# Patient Record
Sex: FEMALE | Race: WHITE | NOT HISPANIC OR LATINO | Employment: STUDENT | URBAN - NONMETROPOLITAN AREA
[De-identification: names, ages, dates, MRNs, and addresses within clinical notes are randomized per-mention and may not be internally consistent; named-entity substitution may affect disease eponyms.]

---

## 2019-06-11 ENCOUNTER — HOSPITAL ENCOUNTER (INPATIENT)
Facility: HOSPITAL | Age: 19
LOS: 1 days | Discharge: HOME | End: 2019-06-12
Attending: INTERNAL MEDICINE | Admitting: UROLOGY

## 2019-06-11 ENCOUNTER — APPOINTMENT (OUTPATIENT)
Dept: CARDIOLOGY | Facility: HOSPITAL | Age: 19
End: 2019-06-11

## 2019-06-11 ENCOUNTER — ANESTHESIA (OUTPATIENT)
Dept: PERIOP | Facility: HOSPITAL | Age: 19
End: 2019-06-11

## 2019-06-11 ENCOUNTER — ANESTHESIA EVENT (OUTPATIENT)
Dept: PERIOP | Facility: HOSPITAL | Age: 19
End: 2019-06-11

## 2019-06-11 ENCOUNTER — APPOINTMENT (OUTPATIENT)
Dept: GENERAL RADIOLOGY | Facility: HOSPITAL | Age: 19
End: 2019-06-11

## 2019-06-11 DIAGNOSIS — N10 ACUTE PYELONEPHRITIS: Primary | ICD-10-CM

## 2019-06-11 DIAGNOSIS — N13.2 URETERAL STONE WITH HYDRONEPHROSIS: ICD-10-CM

## 2019-06-11 PROBLEM — E87.6 HYPOKALEMIA: Status: ACTIVE | Noted: 2019-06-11

## 2019-06-11 PROBLEM — A41.9 SEPSIS (HCC): Status: ACTIVE | Noted: 2019-06-11

## 2019-06-11 PROBLEM — N12 PYELONEPHRITIS: Status: ACTIVE | Noted: 2019-06-11

## 2019-06-11 PROBLEM — I31.39 PERICARDIAL EFFUSION: Status: ACTIVE | Noted: 2019-06-11

## 2019-06-11 LAB
ALBUMIN SERPL-MCNC: 3 G/DL (ref 3.5–5)
ALBUMIN/GLOB SERPL: 1 G/DL (ref 1.1–2.5)
ALP SERPL-CCNC: 86 U/L (ref 50–130)
ALT SERPL W P-5'-P-CCNC: 24 U/L (ref 0–54)
ANION GAP SERPL CALCULATED.3IONS-SCNC: 7 MMOL/L (ref 4–13)
ANISOCYTOSIS BLD QL: ABNORMAL
APTT PPP: 31.5 SECONDS (ref 24.1–35)
AST SERPL-CCNC: 17 U/L (ref 7–45)
BACTERIA UR QL AUTO: ABNORMAL /HPF
BH CV ECHO MEAS - AO MAX PG (FULL): 4.3 MMHG
BH CV ECHO MEAS - AO MAX PG: 8.5 MMHG
BH CV ECHO MEAS - AO MEAN PG (FULL): 3.7 MMHG
BH CV ECHO MEAS - AO MEAN PG: 5.7 MMHG
BH CV ECHO MEAS - AO ROOT AREA (BSA CORRECTED): 1.3
BH CV ECHO MEAS - AO ROOT AREA: 4.9 CM^2
BH CV ECHO MEAS - AO ROOT DIAM: 2.5 CM
BH CV ECHO MEAS - AO V2 MAX: 146 CM/SEC
BH CV ECHO MEAS - AO V2 MEAN: 111 CM/SEC
BH CV ECHO MEAS - AO V2 VTI: 31.4 CM
BH CV ECHO MEAS - AVA(I,A): 2.3 CM^2
BH CV ECHO MEAS - AVA(I,D): 2.3 CM^2
BH CV ECHO MEAS - AVA(V,A): 2.4 CM^2
BH CV ECHO MEAS - AVA(V,D): 2.4 CM^2
BH CV ECHO MEAS - BSA(HAYCOCK): 2 M^2
BH CV ECHO MEAS - BSA: 1.9 M^2
BH CV ECHO MEAS - BZI_BMI: 31.8 KILOGRAMS/M^2
BH CV ECHO MEAS - BZI_METRIC_HEIGHT: 165.1 CM
BH CV ECHO MEAS - BZI_METRIC_WEIGHT: 86.6 KG
BH CV ECHO MEAS - EDV(CUBED): 70.4 ML
BH CV ECHO MEAS - EDV(MOD-SP4): 125 ML
BH CV ECHO MEAS - EDV(TEICH): 75.5 ML
BH CV ECHO MEAS - EF(CUBED): 68.2 %
BH CV ECHO MEAS - EF(MOD-SP4): 61 %
BH CV ECHO MEAS - EF(TEICH): 60.2 %
BH CV ECHO MEAS - ESV(CUBED): 22.4 ML
BH CV ECHO MEAS - ESV(MOD-SP4): 48.7 ML
BH CV ECHO MEAS - ESV(TEICH): 30.1 ML
BH CV ECHO MEAS - FS: 31.7 %
BH CV ECHO MEAS - IVS/LVPW: 1.1
BH CV ECHO MEAS - IVSD: 1.5 CM
BH CV ECHO MEAS - LA DIMENSION: 3.4 CM
BH CV ECHO MEAS - LA/AO: 1.4
BH CV ECHO MEAS - LAT PEAK E' VEL: 13.6 CM/SEC
BH CV ECHO MEAS - LV DIASTOLIC VOL/BSA (35-75): 64.4 ML/M^2
BH CV ECHO MEAS - LV MASS(C)D: 220.1 GRAMS
BH CV ECHO MEAS - LV MASS(C)DI: 113.5 GRAMS/M^2
BH CV ECHO MEAS - LV MAX PG: 4.2 MMHG
BH CV ECHO MEAS - LV MEAN PG: 2 MMHG
BH CV ECHO MEAS - LV SYSTOLIC VOL/BSA (12-30): 25.1 ML/M^2
BH CV ECHO MEAS - LV V1 MAX: 103 CM/SEC
BH CV ECHO MEAS - LV V1 MEAN: 68.2 CM/SEC
BH CV ECHO MEAS - LV V1 VTI: 21 CM
BH CV ECHO MEAS - LVIDD: 4.1 CM
BH CV ECHO MEAS - LVIDS: 2.8 CM
BH CV ECHO MEAS - LVLD AP4: 9.4 CM
BH CV ECHO MEAS - LVLS AP4: 8.3 CM
BH CV ECHO MEAS - LVOT AREA (M): 3.5 CM^2
BH CV ECHO MEAS - LVOT AREA: 3.5 CM^2
BH CV ECHO MEAS - LVOT DIAM: 2.1 CM
BH CV ECHO MEAS - LVPWD: 1.3 CM
BH CV ECHO MEAS - MED PEAK E' VEL: 12.3 CM/SEC
BH CV ECHO MEAS - MV A MAX VEL: 57.7 CM/SEC
BH CV ECHO MEAS - MV DEC TIME: 0.28 SEC
BH CV ECHO MEAS - MV E MAX VEL: 92.6 CM/SEC
BH CV ECHO MEAS - MV E/A: 1.6
BH CV ECHO MEAS - SI(AO): 79.5 ML/M^2
BH CV ECHO MEAS - SI(CUBED): 24.8 ML/M^2
BH CV ECHO MEAS - SI(LVOT): 37.5 ML/M^2
BH CV ECHO MEAS - SI(MOD-SP4): 39.3 ML/M^2
BH CV ECHO MEAS - SI(TEICH): 23.4 ML/M^2
BH CV ECHO MEAS - SV(AO): 154.3 ML
BH CV ECHO MEAS - SV(CUBED): 48 ML
BH CV ECHO MEAS - SV(LVOT): 72.7 ML
BH CV ECHO MEAS - SV(MOD-SP4): 76.3 ML
BH CV ECHO MEAS - SV(TEICH): 45.4 ML
BH CV ECHO MEASUREMENTS AVERAGE E/E' RATIO: 7.15
BILIRUB SERPL-MCNC: 0.4 MG/DL (ref 0.6–1.4)
BILIRUB UR QL STRIP: NEGATIVE
BUN BLD-MCNC: 15 MG/DL (ref 5–21)
BUN/CREAT SERPL: 16 (ref 7–25)
BURR CELLS BLD QL SMEAR: ABNORMAL
CALCIUM SPEC-SCNC: 8.7 MG/DL (ref 8.4–10.4)
CHLORIDE SERPL-SCNC: 106 MMOL/L (ref 98–110)
CLARITY UR: CLEAR
CO2 SERPL-SCNC: 25 MMOL/L (ref 24–31)
COLOR UR: YELLOW
CREAT BLD-MCNC: 0.94 MG/DL (ref 0.5–1.4)
CRP SERPL-MCNC: 34.94 MG/DL (ref 0–0.99)
D-LACTATE SERPL-SCNC: 2 MMOL/L (ref 0.5–2)
D-LACTATE SERPL-SCNC: 2.2 MMOL/L (ref 0.5–2)
DEPRECATED RDW RBC AUTO: 42.2 FL (ref 40–54)
EOSINOPHIL # BLD MANUAL: 0.5 10*3/MM3 (ref 0–0.7)
EOSINOPHIL NFR BLD MANUAL: 2 % (ref 0–4)
ERYTHROCYTE [DISTWIDTH] IN BLOOD BY AUTOMATED COUNT: 13.4 % (ref 12–15)
ERYTHROCYTE [SEDIMENTATION RATE] IN BLOOD: 34 MM/HR (ref 0–20)
GFR SERPL CREATININE-BSD FRML MDRD: 77 ML/MIN/1.73
GLOBULIN UR ELPH-MCNC: 3.1 GM/DL
GLUCOSE BLD-MCNC: 133 MG/DL (ref 70–100)
GLUCOSE UR STRIP-MCNC: NEGATIVE MG/DL
HCT VFR BLD AUTO: 35.7 % (ref 37–47)
HGB BLD-MCNC: 12.4 G/DL (ref 12–16)
HGB UR QL STRIP.AUTO: ABNORMAL
HOLD SPECIMEN: NORMAL
KETONES UR QL STRIP: NEGATIVE
LEFT ATRIUM VOLUME INDEX: 23.9 ML/M2
LEFT ATRIUM VOLUME: 46.3 CM3
LEUKOCYTE ESTERASE UR QL STRIP.AUTO: ABNORMAL
LYMPHOCYTES # BLD MANUAL: 1.74 10*3/MM3 (ref 0.72–4.86)
LYMPHOCYTES NFR BLD MANUAL: 3 % (ref 4–12)
LYMPHOCYTES NFR BLD MANUAL: 7 % (ref 15–45)
MAGNESIUM SERPL-MCNC: 1.9 MG/DL (ref 1.4–2.2)
MAXIMAL PREDICTED HEART RATE: 201 BPM
MCH RBC QN AUTO: 30 PG (ref 28–32)
MCHC RBC AUTO-ENTMCNC: 34.7 G/DL (ref 33–36)
MCV RBC AUTO: 86.2 FL (ref 82–98)
MONOCYTES # BLD AUTO: 0.74 10*3/MM3 (ref 0.19–1.3)
MYELOCYTES NFR BLD MANUAL: 1 % (ref 0–0)
NEUTROPHILS # BLD AUTO: 21.58 10*3/MM3 (ref 1.87–8.4)
NEUTROPHILS NFR BLD MANUAL: 77 % (ref 39–78)
NEUTS BAND NFR BLD MANUAL: 10 % (ref 0–10)
NITRITE UR QL STRIP: NEGATIVE
PH UR STRIP.AUTO: 5.5 [PH] (ref 5–8)
PHOSPHATE SERPL-MCNC: 2.6 MG/DL (ref 2.5–4.5)
PLAT MORPH BLD: NORMAL
PLATELET # BLD AUTO: 282 10*3/MM3 (ref 130–400)
PMV BLD AUTO: 10 FL (ref 6–12)
POIKILOCYTOSIS BLD QL SMEAR: ABNORMAL
POTASSIUM BLD-SCNC: 3 MMOL/L (ref 3.5–5.3)
PROCALCITONIN SERPL-MCNC: 2.15 NG/ML
PROT SERPL-MCNC: 6.1 G/DL (ref 6.3–8.7)
PROT UR QL STRIP: ABNORMAL
RBC # BLD AUTO: 4.14 10*6/MM3 (ref 4.2–5.4)
RBC # UR: ABNORMAL /HPF
REF LAB TEST METHOD: ABNORMAL
SODIUM BLD-SCNC: 138 MMOL/L (ref 135–145)
SP GR UR STRIP: 1.01 (ref 1–1.03)
SQUAMOUS #/AREA URNS HPF: ABNORMAL /HPF
STRESS TARGET HR: 171 BPM
TROPONIN I SERPL-MCNC: <0.012 NG/ML (ref 0–0.03)
TROPONIN I SERPL-MCNC: <0.012 NG/ML (ref 0–0.03)
TSH SERPL DL<=0.05 MIU/L-ACNC: 2.21 MIU/ML (ref 0.47–4.68)
UROBILINOGEN UR QL STRIP: ABNORMAL
WBC MORPH BLD: NORMAL
WBC NRBC COR # BLD: 24.81 10*3/MM3 (ref 4.8–10.8)
WBC UR QL AUTO: ABNORMAL /HPF

## 2019-06-11 PROCEDURE — 85651 RBC SED RATE NONAUTOMATED: CPT | Performed by: INTERNAL MEDICINE

## 2019-06-11 PROCEDURE — 25010000002 ONDANSETRON PER 1 MG: Performed by: NURSE ANESTHETIST, CERTIFIED REGISTERED

## 2019-06-11 PROCEDURE — 0T778DZ DILATION OF LEFT URETER WITH INTRALUMINAL DEVICE, VIA NATURAL OR ARTIFICIAL OPENING ENDOSCOPIC: ICD-10-PCS | Performed by: UROLOGY

## 2019-06-11 PROCEDURE — 84443 ASSAY THYROID STIM HORMONE: CPT | Performed by: INTERNAL MEDICINE

## 2019-06-11 PROCEDURE — 25010000002 DEXAMETHASONE PER 1 MG: Performed by: NURSE ANESTHETIST, CERTIFIED REGISTERED

## 2019-06-11 PROCEDURE — 74420 UROGRAPHY RTRGR +-KUB: CPT

## 2019-06-11 PROCEDURE — G0378 HOSPITAL OBSERVATION PER HR: HCPCS

## 2019-06-11 PROCEDURE — 85007 BL SMEAR W/DIFF WBC COUNT: CPT | Performed by: INTERNAL MEDICINE

## 2019-06-11 PROCEDURE — 87040 BLOOD CULTURE FOR BACTERIA: CPT | Performed by: INTERNAL MEDICINE

## 2019-06-11 PROCEDURE — 94799 UNLISTED PULMONARY SVC/PX: CPT

## 2019-06-11 PROCEDURE — 25010000002 CEFTRIAXONE PER 250 MG: Performed by: INTERNAL MEDICINE

## 2019-06-11 PROCEDURE — 93010 ELECTROCARDIOGRAM REPORT: CPT | Performed by: INTERNAL MEDICINE

## 2019-06-11 PROCEDURE — 84484 ASSAY OF TROPONIN QUANT: CPT | Performed by: INTERNAL MEDICINE

## 2019-06-11 PROCEDURE — 87086 URINE CULTURE/COLONY COUNT: CPT | Performed by: INTERNAL MEDICINE

## 2019-06-11 PROCEDURE — 74420 UROGRAPHY RTRGR +-KUB: CPT | Performed by: UROLOGY

## 2019-06-11 PROCEDURE — 94760 N-INVAS EAR/PLS OXIMETRY 1: CPT

## 2019-06-11 PROCEDURE — 85730 THROMBOPLASTIN TIME PARTIAL: CPT | Performed by: INTERNAL MEDICINE

## 2019-06-11 PROCEDURE — 93005 ELECTROCARDIOGRAM TRACING: CPT | Performed by: INTERNAL MEDICINE

## 2019-06-11 PROCEDURE — 99244 OFF/OP CNSLTJ NEW/EST MOD 40: CPT | Performed by: UROLOGY

## 2019-06-11 PROCEDURE — 84145 PROCALCITONIN (PCT): CPT | Performed by: INTERNAL MEDICINE

## 2019-06-11 PROCEDURE — C1758 CATHETER, URETERAL: HCPCS | Performed by: UROLOGY

## 2019-06-11 PROCEDURE — C1769 GUIDE WIRE: HCPCS | Performed by: UROLOGY

## 2019-06-11 PROCEDURE — 25010000002 IOPAMIDOL 61 % SOLUTION: Performed by: UROLOGY

## 2019-06-11 PROCEDURE — 25010000002 CEFTRIAXONE PER 250 MG: Performed by: NURSE ANESTHETIST, CERTIFIED REGISTERED

## 2019-06-11 PROCEDURE — 93306 TTE W/DOPPLER COMPLETE: CPT | Performed by: INTERNAL MEDICINE

## 2019-06-11 PROCEDURE — 25010000002 MIDAZOLAM PER 1 MG: Performed by: ANESTHESIOLOGY

## 2019-06-11 PROCEDURE — 25010000002 PERFLUTREN 6.52 MG/ML SUSPENSION: Performed by: INTERNAL MEDICINE

## 2019-06-11 PROCEDURE — 52351 CYSTOURETERO & OR PYELOSCOPE: CPT | Performed by: UROLOGY

## 2019-06-11 PROCEDURE — 84100 ASSAY OF PHOSPHORUS: CPT | Performed by: INTERNAL MEDICINE

## 2019-06-11 PROCEDURE — C2617 STENT, NON-COR, TEM W/O DEL: HCPCS | Performed by: UROLOGY

## 2019-06-11 PROCEDURE — 80053 COMPREHEN METABOLIC PANEL: CPT | Performed by: INTERNAL MEDICINE

## 2019-06-11 PROCEDURE — 86140 C-REACTIVE PROTEIN: CPT | Performed by: INTERNAL MEDICINE

## 2019-06-11 PROCEDURE — 83735 ASSAY OF MAGNESIUM: CPT | Performed by: INTERNAL MEDICINE

## 2019-06-11 PROCEDURE — 25810000003 SODIUM CHLORIDE 0.9 % WITH KCL 20 MEQ 20-0.9 MEQ/L-% SOLUTION: Performed by: INTERNAL MEDICINE

## 2019-06-11 PROCEDURE — 83605 ASSAY OF LACTIC ACID: CPT | Performed by: INTERNAL MEDICINE

## 2019-06-11 PROCEDURE — 52332 CYSTOSCOPY AND TREATMENT: CPT | Performed by: UROLOGY

## 2019-06-11 PROCEDURE — 25010000002 HYDROMORPHONE PER 4 MG: Performed by: INTERNAL MEDICINE

## 2019-06-11 PROCEDURE — 25010000002 FENTANYL CITRATE (PF) 250 MCG/5ML SOLUTION: Performed by: NURSE ANESTHETIST, CERTIFIED REGISTERED

## 2019-06-11 PROCEDURE — 81001 URINALYSIS AUTO W/SCOPE: CPT | Performed by: INTERNAL MEDICINE

## 2019-06-11 PROCEDURE — BT141ZZ FLUOROSCOPY OF KIDNEYS, URETERS AND BLADDER USING LOW OSMOLAR CONTRAST: ICD-10-PCS | Performed by: UROLOGY

## 2019-06-11 PROCEDURE — 25010000002 PROPOFOL 10 MG/ML EMULSION: Performed by: NURSE ANESTHETIST, CERTIFIED REGISTERED

## 2019-06-11 PROCEDURE — 85025 COMPLETE CBC W/AUTO DIFF WBC: CPT | Performed by: INTERNAL MEDICINE

## 2019-06-11 PROCEDURE — 93306 TTE W/DOPPLER COMPLETE: CPT

## 2019-06-11 DEVICE — URETERAL STENT
Type: IMPLANTABLE DEVICE | Site: URETER | Status: FUNCTIONAL
Brand: PERCUFLEX™ PLUS

## 2019-06-11 RX ORDER — SODIUM CHLORIDE AND POTASSIUM CHLORIDE 150; 900 MG/100ML; MG/100ML
175 INJECTION, SOLUTION INTRAVENOUS CONTINUOUS
Status: DISCONTINUED | OUTPATIENT
Start: 2019-06-11 | End: 2019-06-11

## 2019-06-11 RX ORDER — FENTANYL CITRATE 50 UG/ML
25 INJECTION, SOLUTION INTRAMUSCULAR; INTRAVENOUS
Status: DISCONTINUED | OUTPATIENT
Start: 2019-06-11 | End: 2019-06-11 | Stop reason: HOSPADM

## 2019-06-11 RX ORDER — OXYBUTYNIN CHLORIDE 5 MG/1
5 TABLET, EXTENDED RELEASE ORAL DAILY
Status: DISCONTINUED | OUTPATIENT
Start: 2019-06-11 | End: 2019-06-12 | Stop reason: HOSPADM

## 2019-06-11 RX ORDER — IBUPROFEN 600 MG/1
600 TABLET ORAL ONCE AS NEEDED
Status: DISCONTINUED | OUTPATIENT
Start: 2019-06-11 | End: 2019-06-11 | Stop reason: HOSPADM

## 2019-06-11 RX ORDER — DEXTROSE MONOHYDRATE 25 G/50ML
12.5 INJECTION, SOLUTION INTRAVENOUS AS NEEDED
Status: DISCONTINUED | OUTPATIENT
Start: 2019-06-11 | End: 2019-06-11 | Stop reason: HOSPADM

## 2019-06-11 RX ORDER — NALOXONE HCL 0.4 MG/ML
0.4 VIAL (ML) INJECTION AS NEEDED
Status: DISCONTINUED | OUTPATIENT
Start: 2019-06-11 | End: 2019-06-11 | Stop reason: HOSPADM

## 2019-06-11 RX ORDER — CEFTRIAXONE 1 G/1
INJECTION, POWDER, FOR SOLUTION INTRAMUSCULAR; INTRAVENOUS AS NEEDED
Status: DISCONTINUED | OUTPATIENT
Start: 2019-06-11 | End: 2019-06-11 | Stop reason: SURG

## 2019-06-11 RX ORDER — IPRATROPIUM BROMIDE AND ALBUTEROL SULFATE 2.5; .5 MG/3ML; MG/3ML
3 SOLUTION RESPIRATORY (INHALATION) ONCE AS NEEDED
Status: DISCONTINUED | OUTPATIENT
Start: 2019-06-11 | End: 2019-06-11 | Stop reason: HOSPADM

## 2019-06-11 RX ORDER — MAGNESIUM HYDROXIDE 1200 MG/15ML
LIQUID ORAL AS NEEDED
Status: DISCONTINUED | OUTPATIENT
Start: 2019-06-11 | End: 2019-06-11 | Stop reason: HOSPADM

## 2019-06-11 RX ORDER — NALOXONE HCL 0.4 MG/ML
0.4 VIAL (ML) INJECTION
Status: DISCONTINUED | OUTPATIENT
Start: 2019-06-11 | End: 2019-06-12 | Stop reason: HOSPADM

## 2019-06-11 RX ORDER — ONDANSETRON 2 MG/ML
4 INJECTION INTRAMUSCULAR; INTRAVENOUS EVERY 6 HOURS PRN
Status: DISCONTINUED | OUTPATIENT
Start: 2019-06-11 | End: 2019-06-12 | Stop reason: HOSPADM

## 2019-06-11 RX ORDER — SODIUM CHLORIDE 0.9 % (FLUSH) 0.9 %
3-10 SYRINGE (ML) INJECTION AS NEEDED
Status: DISCONTINUED | OUTPATIENT
Start: 2019-06-11 | End: 2019-06-12 | Stop reason: HOSPADM

## 2019-06-11 RX ORDER — TAMSULOSIN HYDROCHLORIDE 0.4 MG/1
0.4 CAPSULE ORAL DAILY
Status: DISCONTINUED | OUTPATIENT
Start: 2019-06-11 | End: 2019-06-12 | Stop reason: HOSPADM

## 2019-06-11 RX ORDER — MIDAZOLAM HYDROCHLORIDE 1 MG/ML
2 INJECTION INTRAMUSCULAR; INTRAVENOUS
Status: DISCONTINUED | OUTPATIENT
Start: 2019-06-11 | End: 2019-06-11 | Stop reason: HOSPADM

## 2019-06-11 RX ORDER — FENTANYL CITRATE 50 UG/ML
25 INJECTION, SOLUTION INTRAMUSCULAR; INTRAVENOUS AS NEEDED
Status: DISCONTINUED | OUTPATIENT
Start: 2019-06-11 | End: 2019-06-11 | Stop reason: HOSPADM

## 2019-06-11 RX ORDER — POTASSIUM CHLORIDE 750 MG/1
40 CAPSULE, EXTENDED RELEASE ORAL ONCE
Status: COMPLETED | OUTPATIENT
Start: 2019-06-11 | End: 2019-06-11

## 2019-06-11 RX ORDER — OXYCODONE AND ACETAMINOPHEN 10; 325 MG/1; MG/1
1 TABLET ORAL ONCE AS NEEDED
Status: DISCONTINUED | OUTPATIENT
Start: 2019-06-11 | End: 2019-06-11 | Stop reason: HOSPADM

## 2019-06-11 RX ORDER — SODIUM CHLORIDE 0.9 % (FLUSH) 0.9 %
3 SYRINGE (ML) INJECTION EVERY 12 HOURS SCHEDULED
Status: DISCONTINUED | OUTPATIENT
Start: 2019-06-11 | End: 2019-06-12 | Stop reason: HOSPADM

## 2019-06-11 RX ORDER — SODIUM CHLORIDE 0.9 % (FLUSH) 0.9 %
1-10 SYRINGE (ML) INJECTION AS NEEDED
Status: DISCONTINUED | OUTPATIENT
Start: 2019-06-11 | End: 2019-06-11 | Stop reason: HOSPADM

## 2019-06-11 RX ORDER — PHENYLEPHRINE HCL IN 0.9% NACL 0.8MG/10ML
SYRINGE (ML) INTRAVENOUS AS NEEDED
Status: DISCONTINUED | OUTPATIENT
Start: 2019-06-11 | End: 2019-06-11 | Stop reason: SURG

## 2019-06-11 RX ORDER — ONDANSETRON 2 MG/ML
INJECTION INTRAMUSCULAR; INTRAVENOUS AS NEEDED
Status: DISCONTINUED | OUTPATIENT
Start: 2019-06-11 | End: 2019-06-11 | Stop reason: SURG

## 2019-06-11 RX ORDER — LABETALOL HYDROCHLORIDE 5 MG/ML
5 INJECTION, SOLUTION INTRAVENOUS
Status: DISCONTINUED | OUTPATIENT
Start: 2019-06-11 | End: 2019-06-11 | Stop reason: HOSPADM

## 2019-06-11 RX ORDER — METOCLOPRAMIDE HYDROCHLORIDE 5 MG/ML
5 INJECTION INTRAMUSCULAR; INTRAVENOUS
Status: DISCONTINUED | OUTPATIENT
Start: 2019-06-11 | End: 2019-06-11 | Stop reason: HOSPADM

## 2019-06-11 RX ORDER — FENTANYL CITRATE 50 UG/ML
INJECTION, SOLUTION INTRAMUSCULAR; INTRAVENOUS AS NEEDED
Status: DISCONTINUED | OUTPATIENT
Start: 2019-06-11 | End: 2019-06-11 | Stop reason: SURG

## 2019-06-11 RX ORDER — ONDANSETRON 2 MG/ML
4 INJECTION INTRAMUSCULAR; INTRAVENOUS ONCE AS NEEDED
Status: DISCONTINUED | OUTPATIENT
Start: 2019-06-11 | End: 2019-06-11 | Stop reason: HOSPADM

## 2019-06-11 RX ORDER — OXYCODONE AND ACETAMINOPHEN 7.5; 325 MG/1; MG/1
2 TABLET ORAL EVERY 4 HOURS PRN
Status: DISCONTINUED | OUTPATIENT
Start: 2019-06-11 | End: 2019-06-11 | Stop reason: HOSPADM

## 2019-06-11 RX ORDER — ACETAMINOPHEN 325 MG/1
650 TABLET ORAL EVERY 4 HOURS PRN
Status: DISCONTINUED | OUTPATIENT
Start: 2019-06-11 | End: 2019-06-12 | Stop reason: HOSPADM

## 2019-06-11 RX ORDER — ONDANSETRON 4 MG/1
4 TABLET, FILM COATED ORAL EVERY 6 HOURS PRN
Status: DISCONTINUED | OUTPATIENT
Start: 2019-06-11 | End: 2019-06-12 | Stop reason: HOSPADM

## 2019-06-11 RX ORDER — SODIUM CHLORIDE 9 MG/ML
100 INJECTION, SOLUTION INTRAVENOUS CONTINUOUS
Status: DISCONTINUED | OUTPATIENT
Start: 2019-06-11 | End: 2019-06-12 | Stop reason: HOSPADM

## 2019-06-11 RX ORDER — PROPOFOL 10 MG/ML
VIAL (ML) INTRAVENOUS AS NEEDED
Status: DISCONTINUED | OUTPATIENT
Start: 2019-06-11 | End: 2019-06-11 | Stop reason: SURG

## 2019-06-11 RX ORDER — DEXAMETHASONE SODIUM PHOSPHATE 4 MG/ML
INJECTION, SOLUTION INTRA-ARTICULAR; INTRALESIONAL; INTRAMUSCULAR; INTRAVENOUS; SOFT TISSUE AS NEEDED
Status: DISCONTINUED | OUTPATIENT
Start: 2019-06-11 | End: 2019-06-11 | Stop reason: SURG

## 2019-06-11 RX ORDER — MIDAZOLAM HYDROCHLORIDE 1 MG/ML
1 INJECTION INTRAMUSCULAR; INTRAVENOUS
Status: DISCONTINUED | OUTPATIENT
Start: 2019-06-11 | End: 2019-06-11 | Stop reason: HOSPADM

## 2019-06-11 RX ORDER — HYDROMORPHONE HYDROCHLORIDE 1 MG/ML
0.5 INJECTION, SOLUTION INTRAMUSCULAR; INTRAVENOUS; SUBCUTANEOUS
Status: DISCONTINUED | OUTPATIENT
Start: 2019-06-11 | End: 2019-06-12 | Stop reason: HOSPADM

## 2019-06-11 RX ORDER — HYDROCODONE BITARTRATE AND ACETAMINOPHEN 7.5; 325 MG/1; MG/1
1 TABLET ORAL EVERY 4 HOURS PRN
Status: DISCONTINUED | OUTPATIENT
Start: 2019-06-11 | End: 2019-06-12 | Stop reason: HOSPADM

## 2019-06-11 RX ORDER — SODIUM CHLORIDE, SODIUM LACTATE, POTASSIUM CHLORIDE, CALCIUM CHLORIDE 600; 310; 30; 20 MG/100ML; MG/100ML; MG/100ML; MG/100ML
9 INJECTION, SOLUTION INTRAVENOUS CONTINUOUS
Status: DISCONTINUED | OUTPATIENT
Start: 2019-06-11 | End: 2019-06-11 | Stop reason: HOSPADM

## 2019-06-11 RX ORDER — LIDOCAINE HYDROCHLORIDE 20 MG/ML
INJECTION, SOLUTION INFILTRATION; PERINEURAL AS NEEDED
Status: DISCONTINUED | OUTPATIENT
Start: 2019-06-11 | End: 2019-06-11 | Stop reason: SURG

## 2019-06-11 RX ADMIN — POTASSIUM CHLORIDE AND SODIUM CHLORIDE 175 ML/HR: 900; 150 INJECTION, SOLUTION INTRAVENOUS at 10:39

## 2019-06-11 RX ADMIN — HYDROMORPHONE HYDROCHLORIDE 0.5 MG: 1 INJECTION, SOLUTION INTRAMUSCULAR; INTRAVENOUS; SUBCUTANEOUS at 04:43

## 2019-06-11 RX ADMIN — HYDROMORPHONE HYDROCHLORIDE 0.5 MG: 1 INJECTION, SOLUTION INTRAMUSCULAR; INTRAVENOUS; SUBCUTANEOUS at 22:04

## 2019-06-11 RX ADMIN — CEFTRIAXONE 1 G: 1 INJECTION, POWDER, FOR SOLUTION INTRAMUSCULAR; INTRAVENOUS at 11:58

## 2019-06-11 RX ADMIN — ONDANSETRON HYDROCHLORIDE 4 MG: 2 SOLUTION INTRAMUSCULAR; INTRAVENOUS at 12:09

## 2019-06-11 RX ADMIN — OXYBUTYNIN CHLORIDE 5 MG: 5 TABLET, EXTENDED RELEASE ORAL at 14:41

## 2019-06-11 RX ADMIN — Medication 160 MCG: at 11:58

## 2019-06-11 RX ADMIN — TAMSULOSIN HYDROCHLORIDE 0.4 MG: 0.4 CAPSULE ORAL at 14:41

## 2019-06-11 RX ADMIN — POTASSIUM CHLORIDE 40 MEQ: 750 CAPSULE, EXTENDED RELEASE ORAL at 14:43

## 2019-06-11 RX ADMIN — SODIUM CHLORIDE, PRESERVATIVE FREE 3 ML: 5 INJECTION INTRAVENOUS at 20:08

## 2019-06-11 RX ADMIN — SODIUM CHLORIDE, POTASSIUM CHLORIDE, SODIUM LACTATE AND CALCIUM CHLORIDE 9 ML/HR: 600; 310; 30; 20 INJECTION, SOLUTION INTRAVENOUS at 11:30

## 2019-06-11 RX ADMIN — PROPOFOL 150 MG: 10 INJECTION, EMULSION INTRAVENOUS at 11:55

## 2019-06-11 RX ADMIN — FENTANYL CITRATE 100 MCG: 50 INJECTION INTRAMUSCULAR; INTRAVENOUS at 11:58

## 2019-06-11 RX ADMIN — SODIUM CHLORIDE 100 ML/HR: 9 INJECTION, SOLUTION INTRAVENOUS at 14:41

## 2019-06-11 RX ADMIN — MIDAZOLAM 2 MG: 1 INJECTION INTRAMUSCULAR; INTRAVENOUS at 11:29

## 2019-06-11 RX ADMIN — DEXAMETHASONE SODIUM PHOSPHATE 4 MG: 4 INJECTION, SOLUTION INTRAMUSCULAR; INTRAVENOUS at 12:09

## 2019-06-11 RX ADMIN — CEFTRIAXONE SODIUM 2 G: 2 INJECTION, POWDER, FOR SOLUTION INTRAMUSCULAR; INTRAVENOUS at 20:07

## 2019-06-11 RX ADMIN — FENTANYL CITRATE 150 MCG: 50 INJECTION INTRAMUSCULAR; INTRAVENOUS at 11:55

## 2019-06-11 RX ADMIN — PERFLUTREN 8.48 MG: 6.52 INJECTION, SUSPENSION INTRAVENOUS at 14:17

## 2019-06-11 RX ADMIN — LIDOCAINE HYDROCHLORIDE 80 MG: 20 INJECTION, SOLUTION INFILTRATION; PERINEURAL at 11:55

## 2019-06-11 RX ADMIN — HYDROCODONE BITARTRATE AND ACETAMINOPHEN 1 TABLET: 7.5; 325 TABLET ORAL at 07:44

## 2019-06-11 RX ADMIN — POTASSIUM CHLORIDE AND SODIUM CHLORIDE 175 ML/HR: 900; 150 INJECTION, SOLUTION INTRAVENOUS at 04:43

## 2019-06-11 NOTE — PAYOR COMM NOTE
"ADMIT INPT 6-11-19  UR  [id number]    Radha Conner (18 y.o. Female)     Date of Birth Social Security Number Address Home Phone MRN    [id number]  [id number] [place] [address] [phone] [id number]    Denominational Marital Status          Other Single       Admission Date Admission Type Admitting Provider Attending Provider Department, Room/Bed    6/11/19 Urgent Yousif Hirsch MD Fleming, John Eric, MD [facility] 4B, 409/1    Discharge Date Discharge Disposition Discharge Destination                       Attending Provider:  Yousif Hirsch MD    Allergies:  No Known Allergies    Isolation:  None   Infection:  None   Code Status:  CPR    Ht:  165.1 cm (65\")   Wt:  86.6 kg (191 lb)    Admission Cmt:  None   Principal Problem:  Acute pyelonephritis [N10]                 Active Insurance as of 6/11/2019     Primary Coverage     Payor Plan Insurance Group Employer/Plan Group    [employer] [facility] [employer] EMPLOYEE [id number]     Payor Plan Address Payor Plan Phone Number Payor Plan Fax Number Effective Dates    [address] [phone]  10/1/2018 - None Entered    [place] [address]       Subscriber Name Subscriber Birth Date Member ID       SACHIN CONNER [birth year]7/1963 [id number]                 Emergency Contacts      (Rel.) Home Phone Work Phone Mobile Phone    KHUSHBU CONNER (Mother) [phone] -- --               History & Physical      Td Mcclendon MD at 6/11/2019  4:08 AM              [facility]  HISTORY AND PHYSICAL    Date of Admission: 6/11/2019  Primary Care Physician: Provider, No Known    Subjective     Chief Complaint: Abdominal pain flank pain    History of Present Illness  Patient is 18-year-old white female past medical history for recurrent kidney stones.  She presents to the emergency room at [facility] with a 2 to 3-day history of fevers chills nausea vomiting diarrhea and " "abdominal and flank pain.  She has a history of multiple kidney stones all of which she states she is been able to pass on her own previously.  She was evaluated in the emergency room the CAT scan tonight and found to have a hydronephrosis on the left with an obstructing 5 mm stone about care home down the ureter.  She also had noted on the CAT scan a \"tiny but slightly abnormal amount of pericardial fluid\" this was not noted on previous examinations.  The emergency room provider requested she be transferred here because they did not have cardiology tonight.  The patient is asymptomatic has never had anything like this denies any swelling denies any chest pain.  I believe this is a nonspecific finding however they insisted on her being transferred here for cardiology to evaluate.  She was also noted to be septic from her urinary tract infection and needs a urologist which they did have.  Her white count was 30,000 her creatinine was slightly elevated at 1.68 BUN was 17 potassium is also slightly low at 3.1.  Her EKG is unremarkable for acute changes.  She has been transferred here for further evaluation and management she was febrile up to about 102 in their emergency room she is now however afebrile.        Review of Systems   14 point review of systems negative except for as per HPI    Otherwise complete ROS reviewed and negative except as mentioned in the HPI.    Past Medical History:   Past Medical History:   Diagnosis Date   • Infectious mononucleosis    • Kidney stone      Past Surgical History:No past surgical history on file.  Social History:  reports that she has been smoking.  She has never used smokeless tobacco. She reports that she uses drugs. Drug: Marijuana. She reports that she does not drink alcohol.    Family History:   Positive for hypertension in both parents  Positive for cardiovascular disease in her maternal grandparents    Allergies:  No Known Allergies  Medications:  Prior to Admission " "medications    Not on File     Objective     Vital Signs: /86 (BP Location: Right arm, Patient Position: Sitting)   Pulse 99   Temp 98.3 °F (36.8 °C) (Oral)   Resp 18   Ht 165.1 cm (65\")   Wt 86.6 kg (191 lb)   LMP 04/27/2019 (Within Days)   SpO2 100%   Breastfeeding? No   BMI 31.78 kg/m²    Physical Exam  Gen.:  Well-nourished well-developed white female in no acute distress  HEENT: Atraumatic, normocephalic.  Pupils equal, round, and reactive to light. Extraocular movements intact.  Sclerae anicteric.  TMs negative.  Mucous membranes moist.  Neck is supple without lymphadenopathy.  No JVD is noted.  No carotid bruits are auscultated.  Oropharynx is without erythema or exudate.   Chest: Clear to auscultation and percussion.  CV: Regular rate and rhythm.  Normal S1-S2.  No gallops, murmurs. or rubs.  Abdomen: Soft, slightly tender left lower quadrant, nondistended.  Active bowel sounds,  No hepatosplenomegaly.  No masses.  No hernias.  Positive CVA tenderness on the left  Extremities: No clubbing, edema, or cyanosis.  Capillary refill is normal.  Pulses are 2+ and symmetric at radial and dorsalis pedis.  Posterior tibial pulses are intact and 2+ palpable.  Neuro: Patient is awake, alert, and oriented ×3.  Cranial nerves II through XII are grossly intact.  Motor is 5 out of 5 bilaterally.  DTRs are 2+ and symmetric bilaterally. Sensory exam is nonfocal  Skin: Warm, dry, and intact.  No evidence of breakdown.  Sensorium: Normal thought and affect    Nursing notes and vital signs reviewed        Results Reviewed:  Lab Results (last 24 hours)     ** No results found for the last 24 hours. **        Imaging Results (last 24 hours)     ** No results found for the last 24 hours. **        I have personally reviewed and interpreted the radiology studies and ECG obtained at time of admission.     Assessment / Plan     Assessment:   Active Hospital Problems    Diagnosis   • **Acute pyelonephritis   • " Pericardial effusion   • Sepsis (CMS/HCC)   • Ureteral stone with hydronephrosis   • Hypokalemia   • Pyelonephritis   1.  Acute pyelonephritis with obstructive uropathy plan is to admit patient [facility] these will reculture blood and urine.  We will continue IV fluids for hydration Rocephin has been initiated will continue that.  We will also consult urology as well as obtain a renal ultrasound in the morning.  2.  Sepsis due to pyelonephritis culture blood and urine fluid bolus initiated antibiotics we will repeat lactic acid it was normal at the [facility] facility Tylenol for fever antibiotics as outlined above.  3.  Pericardial effusion uncertain significance check 2D echo depending on findings consider cardiology consultation.  4.  Ureteral stone with hydronephrosis causing acute kidney injury IV fluids for hydration consult urology will check renal ultrasound will make patient n.p.o. until urology sees just in case they want to take her to the OR.  5.  Hypokalemia replace potassium check magnesium  6.  Acute kidney injury probably due to ureteral stone and dehydration will aggressively fluid resuscitate check renal ultrasound urology consult as above.  I do not believe she needs a nephrology consult however would consider if it does not improve or worsens.  7.  Tobacco habituation encouraged smoking cessation patient declines nicotine patch.    Patient seen after midnight         Code Status: Full     I discussed the patient's findings and my recommendations with the patient and her boyfriend.  Her mother is her healthcare surrogate should she not be able to speak for herself.    Estimated length of stay 2 to 3 days    Td Mcclendon MD   06/11/19   4:08 AM              Electronically signed by Td Mcclendon MD at 6/11/2019  4:21 AM       ICU Vital Signs     Row Name 06/11/19 0744 06/11/19 0430 06/11/19 0129 06/11/19 0055          Height  "and Weight    Height  --  --  --  165.1 cm (65\")     Height Method  --  --  --  Stated     Weight  --  --  --  86.6 kg (191 lb)     Weight Method  --  --  --  Standing scale     Ideal Body Weight (IBW) (kg)  --  --  --  57.29     BSA (Calculated - sq m)  --  --  --  1.94 sq meters     BMI (Calculated)  --  --  --  31.8     Weight in (lb) to have BMI = 25  --  --  --  149.9        Vitals    Temp  --  98.3 °F (36.8 °C)  --  98.3 °F (36.8 °C)     Temp src  --  Oral  --  Oral     Pulse  --  98  --  99     Heart Rate Source  --  Monitor  --  Monitor     Resp  --  16  --  18     Resp Rate Source  --  Visual  --  Visual     BP  --  127/63  --  122/86     BP Location  --  Left arm  --  Right arm     BP Method  --  Automatic  --  Automatic     Patient Position  --  Lying  --  Sitting        Oxygen Therapy    SpO2  --  99 %  --  100 %     Pulse Oximetry Type  --  Intermittent  --  Intermittent     Device (Oxygen Therapy)  room air  room air  room air  room air         Intake & Output (last 3 days)       06/08 0701 - 06/09 0700 06/09 0701 - 06/10 0700 06/10 0701 - 06/11 0700 06/11 0701 - 06/12 0700    Urine (mL/kg/hr)   450     Total Output   450     Net   -450                  Lines, Drains & Airways    Active LDAs     None         Inactive LDAs     None                Hospital Medications (all)       Dose Frequency Start End    acetaminophen (TYLENOL) tablet 650 mg 650 mg Every 4 Hours PRN 6/11/2019     Sig - Route: Take 2 tablets by mouth Every 4 (Four) Hours As Needed for Mild Pain . - Oral    cefTRIAXone (ROCEPHIN) 2 g/100 mL 0.9% NS VTB (BRITTNEY) 2 g Every 24 Hours 6/11/2019 6/18/2019    Sig - Route: Infuse 100 mL into a venous catheter Daily. - Intravenous    HYDROcodone-acetaminophen (NORCO) 7.5-325 MG per tablet 1 tablet 1 tablet Every 4 Hours PRN 6/11/2019 6/21/2019    Sig - Route: Take 1 tablet by mouth Every 4 (Four) Hours As Needed for Moderate Pain . - Oral    HYDROmorphone (DILAUDID) injection 0.5 mg 0.5 mg Every 2 " "Hours PRN 6/11/2019 6/21/2019    Sig - Route: Infuse 0.5 mL into a venous catheter Every 2 (Two) Hours As Needed for Severe Pain . - Intravenous    Linked Group 1:  \"And\" Linked Group Details        naloxone (NARCAN) injection 0.4 mg 0.4 mg Every 5 Minutes PRN 6/11/2019     Sig - Route: Infuse 1 mL into a venous catheter Every 5 (Five) Minutes As Needed for Respiratory Depression. - Intravenous    Linked Group 1:  \"And\" Linked Group Details        ondansetron (ZOFRAN) injection 4 mg 4 mg Every 6 Hours PRN 6/11/2019     Sig - Route: Infuse 2 mL into a venous catheter Every 6 (Six) Hours As Needed for Nausea or Vomiting. - Intravenous    Linked Group 2:  \"Or\" Linked Group Details        ondansetron (ZOFRAN) tablet 4 mg 4 mg Every 6 Hours PRN 6/11/2019     Sig - Route: Take 1 tablet by mouth Every 6 (Six) Hours As Needed for Nausea or Vomiting. - Oral    Linked Group 2:  \"Or\" Linked Group Details        sodium chloride 0.9 % flush 3 mL 3 mL Every 12 Hours Scheduled 6/11/2019     Sig - Route: Infuse 3 mL into a venous catheter Every 12 (Twelve) Hours. - Intravenous    sodium chloride 0.9 % flush 3-10 mL 3-10 mL As Needed 6/11/2019     Sig - Route: Infuse 3-10 mL into a venous catheter As Needed for Line Care. - Intravenous    sodium chloride 0.9 % with KCl 20 mEq/L infusion 175 mL/hr Continuous 6/11/2019     Sig - Route: Infuse 175 mL/hr into a venous catheter Continuous. - Intravenous          Blood Administration Record (From admission, onward)    None        Lab Results (last 48 hours)     Procedure Component Value Units Date/Time    C-reactive Protein [[id number]]  (Abnormal) Collected:  06/11/19 0430    Specimen:  Blood Updated:  06/11/19 0726     C-Reactive Protein 34.94 mg/dL     Blood Culture - Blood, Arm, Left [[id number]] Collected:  06/11/19 0430    Specimen:  Blood from Arm, Left Updated:  06/11/19 0706    Blood Culture - Blood, Arm, Right [[id number]] Collected:  06/11/19 0430    Specimen:  Blood from Arm, " Right Updated:  06/11/19 0706    Urinalysis, Microscopic Only - Urine, Clean Catch [[id number]]  (Abnormal) Collected:  06/11/19 0453    Specimen:  Urine, Clean Catch Updated:  06/11/19 0554     RBC, UA 3-5 /HPF      WBC, UA 31-50 /HPF      Bacteria, UA Trace /HPF      Squamous Epithelial Cells, UA 3-6 /HPF      Methodology Manual Light Microscopy    Urinalysis With Culture If Indicated - Urine, Clean Catch [[id number]]  (Abnormal) Collected:  06/11/19 0453    Specimen:  Urine, Clean Catch Updated:  06/11/19 0554     Color, UA Yellow     Appearance, UA Clear     pH, UA 5.5     Specific Gravity, UA 1.008     Glucose, UA Negative     Ketones, UA Negative     Bilirubin, UA Negative     Blood, UA Moderate (2+)     Protein, UA Trace     Leuk Esterase, UA Large (3+)     Nitrite, UA Negative     Urobilinogen, UA 0.2 E.U./dL    Urine Culture - Urine, Urine, Clean Catch [[id number]] Collected:  06/11/19 0453    Specimen:  Urine, Clean Catch Updated:  06/11/19 0554    Manual Differential [[id number]]  (Abnormal) Collected:  06/11/19 0430    Specimen:  Blood Updated:  06/11/19 0552     Neutrophil % 77.0 %      Lymphocyte % 7.0 %      Monocyte % 3.0 %      Eosinophil % 2.0 %      Bands %  10.0 %      Myelocyte % 1.0 %      Neutrophils Absolute 21.58 10*3/mm3      Lymphocytes Absolute 1.74 10*3/mm3      Monocytes Absolute 0.74 10*3/mm3      Eosinophils Absolute 0.50 10*3/mm3      Anisocytosis Slight/1+     Crenated RBC's Slight/1+     Poikilocytes Slight/1+     WBC Morphology Normal     Platelet Morphology Normal    CBC & Differential [[id number]] Collected:  06/11/19 0430    Specimen:  Blood Updated:  06/11/19 0552    Narrative:       The following orders were created for panel order CBC & Differential.  Procedure                               Abnormality         Status                     ---------                               -----------         ------                     CBC Auto Differential[[id number]]        Abnormal             Final result                 Please view results for these tests on the individual orders.    CBC Auto Differential [[id number]]  (Abnormal) Collected:  06/11/19 0430    Specimen:  Blood Updated:  06/11/19 0552     WBC 24.81 10*3/mm3      RBC 4.14 10*6/mm3      Hemoglobin 12.4 g/dL      Hematocrit 35.7 %      MCV 86.2 fL      MCH 30.0 pg      MCHC 34.7 g/dL      RDW 13.4 %      RDW-SD 42.2 fl      MPV 10.0 fL      Platelets 282 10*3/mm3     TSH [[id number]]  (Normal) Collected:  06/11/19 0430    Specimen:  Blood Updated:  06/11/19 0548     TSH 2.210 mIU/mL     Lactic Acid, Plasma [[id number]]  (Abnormal) Collected:  06/11/19 0430    Specimen:  Blood Updated:  06/11/19 0541     Lactate 2.2 mmol/L     Lactic Acid, Reflex Timer (This will reflex a repeat order 3-3:15 hours after ordered.) [[id number]] Collected:  06/11/19 0430    Specimen:  Blood Updated:  06/11/19 0541    Procalcitonin [[id number]]  (Abnormal) Collected:  06/11/19 0430    Specimen:  Blood Updated:  06/11/19 0538     Procalcitonin 2.15 ng/mL     Narrative:       SIRS, sepsis, severe sepsis, and septic shock are categorized according to the criteria of the consensus conference of the American College of Chest Physicians/Society of Critical Care Medicine.    PCT < 0.5 ng/mL     Systemic infection (sepsis) is not likely.    PCT >0.5 and < 2.0 ng/mL Systemic infection (sepsis) is possible, but other conditions are known to elevate PCT as well.    PCT > 2.0 ng/mL     Systemic infection (sepsis) is likely, unless other causes are known.      PCT > 10.0 ng/mL    Important systemic inflammatory response, almost exclusively due to severe bacterial sepsis or septic shock.    PCT values of < 0.5 ng/mL do not exclude an infection, because localized infections (without systemic signs) may be associated with such low concentrations, or a systemic infection in its initial stages (<6 hours).  Increased PCT can occur without infection.  PCT concentrations between 0.5 and  2.0 ng/mL should be interpreted taking into account the patients history.  It is recommended to retest PCT within 6-24 hours if any concentrations < 2.0 ng/mL are obtained.    Troponin [[id number]]  (Normal) Collected:  06/11/19 0430    Specimen:  Blood Updated:  06/11/19 0530     Troponin I <0.012 ng/mL     Comprehensive Metabolic Panel [[id number]]  (Abnormal) Collected:  06/11/19 0430    Specimen:  Blood Updated:  06/11/19 0521     Glucose 133 mg/dL      BUN 15 mg/dL      Creatinine 0.94 mg/dL      Sodium 138 mmol/L      Potassium 3.0 mmol/L      Chloride 106 mmol/L      CO2 25.0 mmol/L      Calcium 8.7 mg/dL      Total Protein 6.1 g/dL      Albumin 3.00 g/dL      ALT (SGPT) 24 U/L      AST (SGOT) 17 U/L      Alkaline Phosphatase 86 U/L      Total Bilirubin 0.4 mg/dL      eGFR Non African Amer 77 mL/min/1.73      Globulin 3.1 gm/dL      A/G Ratio 1.0 g/dL      BUN/Creatinine Ratio 16.0     Anion Gap 7.0 mmol/L     Narrative:       GFR Normal >60  Chronic Kidney Disease <60  Kidney Failure <15    Magnesium [[id number]]  (Normal) Collected:  06/11/19 0430    Specimen:  Blood Updated:  06/11/19 0521     Magnesium 1.9 mg/dL     Phosphorus [[id number]]  (Normal) Collected:  06/11/19 0430    Specimen:  Blood Updated:  06/11/19 0521     Phosphorus 2.6 mg/dL     Sedimentation Rate [[id number]]  (Abnormal) Collected:  06/11/19 0430    Specimen:  Blood Updated:  06/11/19 0521     Sed Rate 34 mm/hr     aPTT [[id number]]  (Normal) Collected:  06/11/19 0430    Specimen:  Blood Updated:  06/11/19 0511     PTT 31.5 seconds           Lab Results (last 48 hours)     Procedure Component Value Units Date/Time    C-reactive Protein [[id number]]  (Abnormal) Collected:  06/11/19 0430    Specimen:  Blood Updated:  06/11/19 0726     C-Reactive Protein 34.94 mg/dL     Blood Culture - Blood, Arm, Left [[id number]] Collected:  06/11/19 0430    Specimen:  Blood from Arm, Left Updated:  06/11/19 0706    Blood Culture - Blood, Arm, Right [[id number]]  Collected:  06/11/19 0430    Specimen:  Blood from Arm, Right Updated:  06/11/19 0706    Urinalysis, Microscopic Only - Urine, Clean Catch [[id number]]  (Abnormal) Collected:  06/11/19 0453    Specimen:  Urine, Clean Catch Updated:  06/11/19 0554     RBC, UA 3-5 /HPF      WBC, UA 31-50 /HPF      Bacteria, UA Trace /HPF      Squamous Epithelial Cells, UA 3-6 /HPF      Methodology Manual Light Microscopy    Urinalysis With Culture If Indicated - Urine, Clean Catch [[id number]]  (Abnormal) Collected:  06/11/19 0453    Specimen:  Urine, Clean Catch Updated:  06/11/19 0554     Color, UA Yellow     Appearance, UA Clear     pH, UA 5.5     Specific Gravity, UA 1.008     Glucose, UA Negative     Ketones, UA Negative     Bilirubin, UA Negative     Blood, UA Moderate (2+)     Protein, UA Trace     Leuk Esterase, UA Large (3+)     Nitrite, UA Negative     Urobilinogen, UA 0.2 E.U./dL    Urine Culture - Urine, Urine, Clean Catch [[id number]] Collected:  06/11/19 0453    Specimen:  Urine, Clean Catch Updated:  06/11/19 0554    Manual Differential [[id number]]  (Abnormal) Collected:  06/11/19 0430    Specimen:  Blood Updated:  06/11/19 0552     Neutrophil % 77.0 %      Lymphocyte % 7.0 %      Monocyte % 3.0 %      Eosinophil % 2.0 %      Bands %  10.0 %      Myelocyte % 1.0 %      Neutrophils Absolute 21.58 10*3/mm3      Lymphocytes Absolute 1.74 10*3/mm3      Monocytes Absolute 0.74 10*3/mm3      Eosinophils Absolute 0.50 10*3/mm3      Anisocytosis Slight/1+     Crenated RBC's Slight/1+     Poikilocytes Slight/1+     WBC Morphology Normal     Platelet Morphology Normal    CBC & Differential [[id number]] Collected:  06/11/19 0430    Specimen:  Blood Updated:  06/11/19 0552    Narrative:       The following orders were created for panel order CBC & Differential.  Procedure                               Abnormality         Status                     ---------                               -----------         ------                      CBC Auto Differential[[id number]]        Abnormal            Final result                 Please view results for these tests on the individual orders.    CBC Auto Differential [[id number]]  (Abnormal) Collected:  06/11/19 0430    Specimen:  Blood Updated:  06/11/19 0552     WBC 24.81 10*3/mm3      RBC 4.14 10*6/mm3      Hemoglobin 12.4 g/dL      Hematocrit 35.7 %      MCV 86.2 fL      MCH 30.0 pg      MCHC 34.7 g/dL      RDW 13.4 %      RDW-SD 42.2 fl      MPV 10.0 fL      Platelets 282 10*3/mm3     TSH [[id number]]  (Normal) Collected:  06/11/19 0430    Specimen:  Blood Updated:  06/11/19 0548     TSH 2.210 mIU/mL     Lactic Acid, Plasma [[id number]]  (Abnormal) Collected:  06/11/19 0430    Specimen:  Blood Updated:  06/11/19 0541     Lactate 2.2 mmol/L     Lactic Acid, Reflex Timer (This will reflex a repeat order 3-3:15 hours after ordered.) [[id number]] Collected:  06/11/19 0430    Specimen:  Blood Updated:  06/11/19 0541    Procalcitonin [[id number]]  (Abnormal) Collected:  06/11/19 0430    Specimen:  Blood Updated:  06/11/19 0538     Procalcitonin 2.15 ng/mL     Narrative:       SIRS, sepsis, severe sepsis, and septic shock are categorized according to the criteria of the consensus conference of the American College of Chest Physicians/Society of Critical Care Medicine.    PCT < 0.5 ng/mL     Systemic infection (sepsis) is not likely.    PCT >0.5 and < 2.0 ng/mL Systemic infection (sepsis) is possible, but other conditions are known to elevate PCT as well.    PCT > 2.0 ng/mL     Systemic infection (sepsis) is likely, unless other causes are known.      PCT > 10.0 ng/mL    Important systemic inflammatory response, almost exclusively due to severe bacterial sepsis or septic shock.    PCT values of < 0.5 ng/mL do not exclude an infection, because localized infections (without systemic signs) may be associated with such low concentrations, or a systemic infection in its initial stages (<6 hours).  Increased PCT can  occur without infection.  PCT concentrations between 0.5 and 2.0 ng/mL should be interpreted taking into account the patients history.  It is recommended to retest PCT within 6-24 hours if any concentrations < 2.0 ng/mL are obtained.    Troponin [[id number]]  (Normal) Collected:  06/11/19 0430    Specimen:  Blood Updated:  06/11/19 0530     Troponin I <0.012 ng/mL     Comprehensive Metabolic Panel [[id number]]  (Abnormal) Collected:  06/11/19 0430    Specimen:  Blood Updated:  06/11/19 0521     Glucose 133 mg/dL      BUN 15 mg/dL      Creatinine 0.94 mg/dL      Sodium 138 mmol/L      Potassium 3.0 mmol/L      Chloride 106 mmol/L      CO2 25.0 mmol/L      Calcium 8.7 mg/dL      Total Protein 6.1 g/dL      Albumin 3.00 g/dL      ALT (SGPT) 24 U/L      AST (SGOT) 17 U/L      Alkaline Phosphatase 86 U/L      Total Bilirubin 0.4 mg/dL      eGFR Non African Amer 77 mL/min/1.73      Globulin 3.1 gm/dL      A/G Ratio 1.0 g/dL      BUN/Creatinine Ratio 16.0     Anion Gap 7.0 mmol/L     Narrative:       GFR Normal >60  Chronic Kidney Disease <60  Kidney Failure <15    Magnesium [[id number]]  (Normal) Collected:  06/11/19 0430    Specimen:  Blood Updated:  06/11/19 0521     Magnesium 1.9 mg/dL     Phosphorus [[id number]]  (Normal) Collected:  06/11/19 0430    Specimen:  Blood Updated:  06/11/19 0521     Phosphorus 2.6 mg/dL     Sedimentation Rate [[id number]]  (Abnormal) Collected:  06/11/19 0430    Specimen:  Blood Updated:  06/11/19 0521     Sed Rate 34 mm/hr     aPTT [[id number]]  (Normal) Collected:  06/11/19 0430    Specimen:  Blood Updated:  06/11/19 0511     PTT 31.5 seconds           Orders (last 48 hrs)     Start     Ordered    06/12/19 0600  XR Chest 1 View  1 Time Imaging      06/11/19 0408 06/11/19 2100  cefTRIAXone (ROCEPHIN) 2 g/100 mL 0.9% NS VTB (BRITTNEY)  Every 24 Hours      06/11/19 0408 06/11/19 0900  sodium chloride 0.9 % flush 3 mL  Every 12 Hours Scheduled      06/11/19 0408    06/11/19 0707  Adult  Transthoracic Echo Complete W/ Cont if Necessary Per Protocol  Once     Comments:  New pericardial effusion    06/11/19 0408    06/11/19 0702  Inpatient Urology Consult  IN AM     Specialty:  Urology  Provider:  (Not yet assigned)    06/11/19 0408    06/11/19 0700  Troponin  Now Then Every 3 Hours      06/11/19 0409    06/11/19 0555  Urine Culture - Urine,  Once      06/11/19 0554    06/11/19 0542  Lactic Acid, Reflex Timer (This will reflex a repeat order 3-3:15 hours after ordered.)  Once     Comments:  Automatic Reflex Lactate Will Occur 3 Hours From Result Time With an Initial Lactate Greater Than 2      06/11/19 0541    06/11/19 0518  Urinalysis, Microscopic Only - Urine, Clean Catch  Once      06/11/19 0517    06/11/19 0504  Manual Differential  Once      06/11/19 0503    06/11/19 0500  sodium chloride 0.9 % with KCl 20 mEq/L infusion  Continuous      06/11/19 0408    06/11/19 0435  NPO Diet NPO Except: Ice chips, Sips With Meds  Diet Effective Now      06/11/19 0434    06/11/19 0409  ECG 12 Lead  Now & in 3 Hours      06/11/19 0409    06/11/19 0405  Tobacco Cessation Education  Prior to Discharge      06/11/19 0408    06/11/19 0404  ondansetron (ZOFRAN) tablet 4 mg  Every 6 Hours PRN      06/11/19 0408    06/11/19 0404  ondansetron (ZOFRAN) injection 4 mg  Every 6 Hours PRN      06/11/19 0408    06/11/19 0403  HYDROmorphone (DILAUDID) injection 0.5 mg  Every 2 Hours PRN      06/11/19 0408    06/11/19 0403  naloxone (NARCAN) injection 0.4 mg  Every 5 Minutes PRN      06/11/19 0408    06/11/19 0403  HYDROcodone-acetaminophen (NORCO) 7.5-325 MG per tablet 1 tablet  Every 4 Hours PRN      06/11/19 0408    06/11/19 0403  acetaminophen (TYLENOL) tablet 650 mg  Every 4 Hours PRN      06/11/19 0408    06/11/19 0402  Sedimentation Rate  Once      06/11/19 0408    06/11/19 0402  C-reactive Protein  Once      06/11/19 0408    06/11/19 0401  Urinalysis With Microscopic If Indicated (No Culture) - Urine, Clean Catch   STAT,   Status:  Canceled      06/11/19 0408    06/11/19 0401  Blood Culture - Blood,  Once      06/11/19 0408    06/11/19 0401  Blood Culture - Blood,  Once     Comments:  From Different Site Than 1st Blood Culture      06/11/19 0408    06/11/19 0401  Urinalysis With Culture If Indicated - Urine, Clean Catch  Once      06/11/19 0408    06/11/19 0400  Vital Signs  Every 4 Hours      06/11/19 0408    06/11/19 0400  Neurovascular Checks  Every 4 Hours      06/11/19 0408    06/11/19 0400  Lactic Acid, Plasma  STAT     Comments:  Automatic Reflex Lactate Will Occur 3 Hours From Result Time With an Initial Lactate Greater Than 2      06/11/19 0408    06/11/19 0400  CBC & Differential  STAT      06/11/19 0408    06/11/19 0400  Procalcitonin  STAT      06/11/19 0408    06/11/19 0400  Comprehensive Metabolic Panel  STAT,   Status:  Canceled      06/11/19 0408    06/11/19 0400  Comprehensive Metabolic Panel  STAT      06/11/19 0408    06/11/19 0400  aPTT  STAT      06/11/19 0408    06/11/19 0400  Magnesium  STAT      06/11/19 0408    06/11/19 0400  Phosphorus  STAT      06/11/19 0408    06/11/19 0400  TSH  STAT      06/11/19 0408    06/11/19 0400  Troponin  STAT,   Status:  Canceled      06/11/19 0408    06/11/19 0400  CBC Auto Differential  PROCEDURE ONCE      06/11/19 0408    06/11/19 0358  Cardiac Monitoring  Continuous      06/11/19 0408    06/11/19 0358  Activity - Ad Betty  Until Discontinued      06/11/19 0408    06/11/19 0358  Insert 2 Large Bore (18G or Larger) Peripheral IVs  Once      06/11/19 0408    06/11/19 0358  Diet Regular  Diet Effective Now,   Status:  Canceled      06/11/19 0408    06/11/19 0357  VTE Prophylaxis Not Indicated: Acute Infection/Rheumatologic Disorder (1), Obesity-BMI >30 (1); </= 3 (Low Risk)  Once      06/11/19 0408    06/11/19 0356  Code Status and Medical Interventions:  Continuous      06/11/19 0408    06/11/19 0356  Intake & Output  Every Shift      06/11/19 0408    06/11/19 0356  Weigh  Patient  Once      06/11/19 0408    06/11/19 0356  Oxygen Therapy- Nasal Cannula; Titrate for SPO2: 90% - 95%  Continuous      06/11/19 0408    06/11/19 0356  Blood Culture - Blood,  Once,   Status:  Canceled      06/11/19 0408    06/11/19 0356  Blood Culture - Blood,  Once,   Status:  Canceled      06/11/19 0408    06/11/19 0356  Insert Peripheral IV  Once      06/11/19 0408    06/11/19 0356  Saline Lock & Maintain IV Access  Continuous      06/11/19 0408    06/11/19 0356  Initiate Observation Status  Once      06/11/19 0408    06/11/19 0355  sodium chloride 0.9 % flush 3-10 mL  As Needed      06/11/19 0408    --  SCANNED - TELEMETRY        06/11/19 0000        Ventilator/Non-Invasive Ventilation Settings (From admission, onward)    None        Operative/Procedure Notes (all)     No notes of this type exist for this encounter.          Physician Progress Notes (last 48 hours) (Notes from 6/9/2019  8:04 AM through 6/11/2019  8:04 AM)     No notes of this type exist for this encounter.        Navi Santos, RN   Registered Nurse   Cardiology   Plan of Care   Signed   Date of Service:  6/11/2019  5:14 AM   Creation Time:  6/11/2019  5:14 AM            Signed           Problem: Patient Care Overview  Goal: Plan of Care Review  Outcome: Ongoing (interventions implemented as appropriate)    06/11/19 0508   Coping/Psychosocial   Plan of Care Reviewed With patient   Plan of Care Review   Progress no change   OTHER   Outcome Summary Pt admitted this shift as a transfer from [facility] with a ureteral stone, UTI, and pericardial effusion. PT screened sepsis positive at [facility] and here as well-IV ABX and boluses given at [facility] and IV ABX to continue here. VSS. NSR 87-92. Echo and CXR today. Urology consult. NPO. Safety maintained. Will continue to monitor and notify MD of any changes.       Goal: Individualization and Mutuality  Outcome: Ongoing (interventions implemented as appropriate)     Goal: Discharge Needs  Assessment  Outcome: Ongoing (interventions implemented as appropriate)    06/11/19 0508   Discharge Needs Assessment   Readmission Within the Last 30 Days no previous admission in last 30 days   Concerns to be Addressed no discharge needs identified         Problem: Pain, Acute (Adult)  Goal: Identify Related Risk Factors and Signs and Symptoms  Outcome: Outcome(s) achieved Date Met: 06/11/19 06/11/19 0508   Pain, Acute (Adult)   Related Risk Factors (Acute Pain) infection;patient perception;persistent pain   Signs and Symptoms (Acute Pain) guarding/abnormal posturing/positioning;moaning;verbalization of pain descriptors      Goal: Acceptable Pain Control/Comfort Level  Outcome: Ongoing (interventions implemented as appropriate)                         Consult Notes (last 48 hours) (Notes from 6/9/2019  8:04 AM through 6/11/2019  8:04 AM)     No notes of this type exist for this encounter.

## 2019-06-11 NOTE — PLAN OF CARE
Problem: Patient Care Overview  Goal: Plan of Care Review  Outcome: Ongoing (interventions implemented as appropriate)   06/11/19 0508   Coping/Psychosocial   Plan of Care Reviewed With patient   Plan of Care Review   Progress no change   OTHER   Outcome Summary Pt admitted this shift as a transfer from [facility] with a ureteral stone, UTI, and pericardial effusion. PT screened sepsis positive at [facility] and here as well-IV ABX and boluses given at [facility] and IV ABX to continue here. VSS. NSR 87-92. Echo and CXR today. Urology consult. NPO. Safety maintained. Will continue to monitor and notify MD of any changes.      Goal: Individualization and Mutuality  Outcome: Ongoing (interventions implemented as appropriate)    Goal: Discharge Needs Assessment  Outcome: Ongoing (interventions implemented as appropriate)   06/11/19 0508   Discharge Needs Assessment   Readmission Within the Last 30 Days no previous admission in last 30 days   Concerns to be Addressed no discharge needs identified       Problem: Pain, Acute (Adult)  Goal: Identify Related Risk Factors and Signs and Symptoms  Outcome: Outcome(s) achieved Date Met: 06/11/19 06/11/19 0508   Pain, Acute (Adult)   Related Risk Factors (Acute Pain) infection;patient perception;persistent pain   Signs and Symptoms (Acute Pain) guarding/abnormal posturing/positioning;moaning;verbalization of pain descriptors     Goal: Acceptable Pain Control/Comfort Level  Outcome: Ongoing (interventions implemented as appropriate)

## 2019-06-11 NOTE — CONSULTS
Consults         Referring Provider: Torrey  Reason for Consultation: Left Pyelonephritis/Left Ureteral Stent    Chief complaint Left Flank Pain    Subjective .     History of present illness:  Urolithiasis  Patient complains of left flank pain with radiation to the abdomen. Onset of symptoms was abrupt starting 4 days ago with unchanged course since that time. Patient describes the pain as colicky, intermittent and rated as severe. The patient has had nausea. There has been fever and chills. The patient is not complaining of dysuria, frequency, or urgency.  Previous management of stones includes spontaneous passage      Review of Systems  The following systems were reviewed and negative;  eyes, ENT, respiratory, integument, breast, hematologic / lymphatic, musculoskeletal, neurological, behavioral/psych, endocrine and allergies / immunologic     History  Past Medical History:   Diagnosis Date   • Infectious mononucleosis    • Kidney stone        History reviewed. No pertinent surgical history.    Family History   Problem Relation Age of Onset   • Hypertension Mother    • Hypertension Father    • Heart disease Maternal Grandmother    • Heart disease Maternal Grandfather        Social History     Socioeconomic History   • Marital status: Single     Spouse name: Not on file   • Number of children: Not on file   • Years of education: Not on file   • Highest education level: Not on file   Tobacco Use   • Smoking status: Current Every Day Smoker   • Smokeless tobacco: Never Used   Substance and Sexual Activity   • Alcohol use: No     Frequency: Never   • Drug use: Yes     Types: Marijuana       Current Facility-Administered Medications   Medication Dose Route Frequency Provider Last Rate Last Dose   • acetaminophen (TYLENOL) tablet 650 mg  650 mg Oral Q4H PRN Td Mcclendon MD       • cefTRIAXone (ROCEPHIN) 2 g/100 mL 0.9% NS VTB (BRITTNEY)  2 g Intravenous Q24H Td Mcclendon MD       • HYDROcodone-acetaminophen  (NORCO) 7.5-325 MG per tablet 1 tablet  1 tablet Oral Q4H PRN Td Mcclendon MD   1 tablet at 06/11/19 0744   • HYDROmorphone (DILAUDID) injection 0.5 mg  0.5 mg Intravenous Q2H PRN Td Mcclendon MD   0.5 mg at 06/11/19 0443    And   • naloxone (NARCAN) injection 0.4 mg  0.4 mg Intravenous Q5 Min PRN Td Mcclendon MD       • ondansetron (ZOFRAN) tablet 4 mg  4 mg Oral Q6H PRN Td Mcclendon MD        Or   • ondansetron (ZOFRAN) injection 4 mg  4 mg Intravenous Q6H PRN Td Mcclendon MD       • sodium chloride 0.9 % flush 3 mL  3 mL Intravenous Q12H Td Mcclendon MD       • sodium chloride 0.9 % flush 3-10 mL  3-10 mL Intravenous PRN Td Mcclendon MD       • sodium chloride 0.9 % with KCl 20 mEq/L infusion  175 mL/hr Intravenous Continuous Td Mcclendon  mL/hr at 06/11/19 0847 175 mL/hr at 06/11/19 0847        No Known Allergies    Objective     Vital Signs   Temp:  [98.3 °F (36.8 °C)] 98.3 °F (36.8 °C)  Heart Rate:  [] 102  Resp:  [16-18] 16  BP: (122-129)/(63-86) 129/71    Intake/Output Summary (Last 24 hours) at 6/11/2019 0930  Last data filed at 6/11/2019 0744  Gross per 24 hour   Intake --   Output 550 ml   Net -550 ml       Physical Exam:     General Appearance:    Alert, cooperative, in no acute distress   Head:    Normocephalic, without obvious abnormality, atraumatic   Eyes:            Lids and lashes normal, conjunctivae and sclerae normal, no   icterus, no pallor, corneas clear, PERRLA   Ears:    Ears appear intact with no abnormalities noted   Throat:   No oral lesions, no thrush, oral mucosa moist   Neck:   No adenopathy, supple, trachea midline, no thyromegaly, no   carotid bruit, no JVD   Back:     No kyphosis present, no scoliosis present, no skin lesions,      erythema or scars, no tenderness to percussion or                   palpation,   range of motion normal   Lungs:     Respirations unlabored    Heart:    Regular rate   Chest Wall:    No  abnormalities observed   Abdomen:     No masses, no organomegaly, soft        non-tender, non-distended, no guarding, no rebound              + L CVA  Tenderness     Genitorurinary:   Deferred until time of surgery   Extremities:   Moves all extremities well, no edema, no cyanosis, no             redness   Pulses:   Pulses palpable and equal bilaterally   Skin:   No bleeding, bruising or rash   Lymph nodes:   No palpable adenopathy   Neurologic:   Cranial nerves 2 - 12 grossly intact       Results Review:   I reviewed the patient's new clinical results.      CT independent review  The CT scan of the abdomen/pelvis done without contrast is available for me to review.  Treatment recommendations require an independent review.  First I scanned the liver, spleen, and bowel pattern.  The retroperitoneum including the major vessels and lymphatic packages are briefly reviewed.  This film as been reviewed by the radiologist to determine any non urologic abnormalities that are present.  The kidneys are closely inspected for size, symmetry, contour, parenchymal thickness, perinephric reaction, presence of calcifications, and intrarenal dilation of the collecting system.  The ureters are inspected for their course, caliber, and any calcifications.  The bladder is inspected for its thickness, size, and presence of any calcifications.  This scan shows:    The right kidney appears normal on this non-contrasted CT scan.  The renal parenchymal is normal in thickness.  There are no solid masses or cysts.  There is no hydronephrosis.  There are no stones.      The left kidney appears swollen, hydronephrotic left kidney, 5mm proximal ureteral stone    The bladder appears normal on this non-contrasted CT scan.  The bladder appears normal in thickness.  There no masses or stones seen on this exam.      Imaging Results (last 24 hours)     ** No results found for the last 24 hours. **            Assessment/Plan       Acute pyelonephritis     Pericardial effusion    Sepsis (CMS/HCC)    Ureteral stone with hydronephrosis    Hypokalemia    Pyelonephritis      I reviewed records from ER from [facility].  Patient had a temperature of 102 as well as white count of 31,000.  She had nitrite positive urine.  She had multiple bacteria in her urine.  She was up to transferred here for further care.  I personally reviewed her images with the findings mentioned above.  She is a 5 mm left ureteral stone with associated hydronephrosis.  Given the signs and symptoms of systemic infection I recommended left ureteral stenting in urgent fashion.  I would recommend 2 weeks of culture specific antibiotics followed by definitive management the stone.  I discussed definitive management with the either ureteroscopy or ESWL following her antibiotic course.  Given the fact she has a lower pole stone I think ureteroscopy probably would be the best option.    I discussed the patients findings and my recommendations with patient    Ayo Randle MD  06/11/19  9:30 AM

## 2019-06-11 NOTE — ANESTHESIA PREPROCEDURE EVALUATION
Anesthesia Evaluation     Patient summary reviewed   NPO Solid Status: > 8 hours             Airway   Mallampati: II  TM distance: >3 FB  Neck ROM: full  Dental      Pulmonary    (+) a smoker,   (-) COPD, asthma, sleep apnea  Cardiovascular   Exercise tolerance: excellent (>7 METS)    (-) pacemaker, past MI, angina, cardiac stents      Neuro/Psych  (-) seizures, TIA, CVA  GI/Hepatic/Renal/Endo    (+) obesity,     (-) GERD, liver disease, no renal disease, diabetes    Musculoskeletal     Abdominal    Substance History      OB/GYN          Other                        Anesthesia Plan    ASA 2     general     intravenous induction   Anesthetic plan, all risks, benefits, and alternatives have been provided, discussed and informed consent has been obtained with: patient.

## 2019-06-11 NOTE — PLAN OF CARE
Problem: Patient Care Overview  Goal: Plan of Care Review  Outcome: Ongoing (interventions implemented as appropriate)   06/11/19 5376   Coping/Psychosocial   Plan of Care Reviewed With patient   Plan of Care Review   Progress improving   OTHER   Outcome Summary Patient had her cystoscopy and ureteral stent insertion today. Patient's flank pain is improving, with only given the norco as ordered. Normal Saline at 100 cc/hr. EF is 61-65% per echo today. VSS. Possible home tomorrow early morning.      Goal: Individualization and Mutuality  Outcome: Ongoing (interventions implemented as appropriate)      Problem: Pain, Acute (Adult)  Goal: Acceptable Pain Control/Comfort Level  Outcome: Ongoing (interventions implemented as appropriate)

## 2019-06-11 NOTE — OP NOTE
CYSTOSCOPY URETERAL CATHETER/STENT INSERTION  Procedure Note    Radha Diaz  6/11/2019    Pre-op Diagnosis:   Acute pyelonephritis [N10]  Ureteral stone with hydronephrosis [N13.2]    Post-op Diagnosis:     Post-Op Diagnosis Codes:     * Acute pyelonephritis [N10]     * Ureteral stone with hydronephrosis [N13.2]    Procedure/CPT® Codes:      Procedure(s):  CYSTOSCOPY BILATERAL RETROGRADE PYELOGRAM AND URETERAL STENT INSERTION    Surgeon(s):  Ayo Randle MD    Anesthesia: General    Staff:   Circulator: Ivy Ramirez RN  Scrub Person: Ngoc Gordon; Osvaldo Rodgers    Estimated Blood Loss: none    Specimens:                None      Drains:      Findings: 5MM proximal ureteral stone  Retrograde manipulation into lower pole  6 Fr 24cm Percuflex stent in good position  No nanda purulence noted    Complications: None    Indications: 18-year-old female transferred from outside facility with evidence of urinary tract infection obstructing ureteral stone.  Taken the operating room for urgent decompression of her collecting system with ureteral stenting with plans for definitive management of her stone after 2 weeks of culture specific antibiotics.    Description of Procedure: After informed consent was obtained, the patient at the operating where she underwent general endotracheal anesthesia in the supine position.  She was converted to the dorsolithotomy position.  She was prepped and draped in the usual sterile fashion.  Timeout was done to ensure the correct patient procedure and site.  She did receive preoperative antibiotics in the holding area.    22 Pakistani Storz endoscope certain urethra.  The bladder was inspected there is no lesions noted the left ureteral orifice was identified and can 0.38 sensor tip wire I placed the wire and was able to manipulate it past the stone and doing so it appeared that it retropulsed into the kidney.  I placed a 5 Pakistani open-ended catheter over the wire into the  renal pelvis and remove the wire.  There is no purulent urine noted.  I then instilled 10 cc dilute contrast outlining the collecting system with the findings to be dictated on a separate note.  I then placed a wire back up to place a 6 Romanian 24 similar Percuflex stent with a curl seen in the renal pelvis and a good curl seen distally in the bladder.  Long-term retention of the right UO and a 5 Romanian cone-tip catheter was placed 5 cc dilute contrast used to outline the collecting system the findings of the head later.  The bladder was then drained scope was removed the patient awakened anesthesia transferred recovery in satisfactory condition.    Ayo Randle MD     Date: 6/11/2019  Time: 12:24 PM

## 2019-06-11 NOTE — BRIEF OP NOTE
CYSTOSCOPY URETERAL CATHETER/STENT INSERTION  Progress Note    Radha Diaz  6/11/2019    Pre-op Diagnosis:   Acute pyelonephritis [N10]  Ureteral stone with hydronephrosis [N13.2]       Post-Op Diagnosis Codes:     * Acute pyelonephritis [N10]     * Ureteral stone with hydronephrosis [N13.2]    Procedure/CPT® Codes:      Procedure(s):  CYSTOSCOPY LEFT RETROGRADE PYELOGRAM AND URETERAL STENT INSERTION    Surgeon(s):  Ayo Randle MD    Anesthesia: General    Staff:   Circulator: Ivy Ramirez, RN  Scrub Person: Ngoc Gordon; Osvaldo Rodgers    Findings:   Left retrograde Polygram read: A 5 Wolof open-ended catheter was placed over previously placed 0.38 sensor tip wire and 10 cc dilute contrast used to outline the collecting system.  The ureter was normal in course and caliber there was a area of narrowing at the UPJ.  There was mild hydronephrosis.  Mild there is mild dilation of the blunting of the renal calyces.  There was a filling defect noted in the lower pole.    Right retrograde pyelogram read 5 Wolof cone-tip catheter was placed into the ureter and 5 cc dilute contrast used on the collecting system.  The ureter was normal course and caliber there is no filling defect there is no hydronephrosis.          Ayo Randle MD     Date: 6/11/2019  Time: 12:28 PM

## 2019-06-11 NOTE — ANESTHESIA POSTPROCEDURE EVALUATION
"Patient: Radha Diaz    Procedure Summary     Date:  06/11/19 Room / Location:   [facility] OR 01 / [facility] OR    Anesthesia Start:  1153 Anesthesia Stop:  1219    Procedure:  CYSTOSCOPY LEFT RETROGRADE PYELOGRAM AND URETERAL STENT INSERTION (Left Ureter) Diagnosis:       Acute pyelonephritis      Ureteral stone with hydronephrosis      (Acute pyelonephritis [N10])      (Ureteral stone with hydronephrosis [N13.2])    Surgeon:  Ayo Randle MD Provider:  Juan Pablo Santos CRNA    Anesthesia Type:  general ASA Status:  2          Anesthesia Type: general  Last vitals  BP   110/60 (06/11/19 1300)   Temp   98.2 °F (36.8 °C) (06/11/19 1300)   Pulse   92 (06/11/19 1300)   Resp   18 (06/11/19 1300)     SpO2   100 % (06/11/19 1300)     Post Anesthesia Care and Evaluation    PONV Status: none  Comments: Patient d/c from PACU prior to anes eval based on Eugenia score.  Please see RN notes for details of d/c criteria.    Blood pressure 110/60, pulse 92, temperature 98.2 °F (36.8 °C), temperature source Oral, resp. rate 18, height 165.1 cm (65\"), weight 88.6 kg (195 lb 5.2 oz), last menstrual period 04/27/2019, SpO2 100 %, not currently breastfeeding.          "

## 2019-06-11 NOTE — H&P
"    [facility] Medicine Services  HISTORY AND PHYSICAL    Date of Admission: 6/11/2019  Primary Care Physician: Provider, No Known    Subjective     Chief Complaint: Abdominal pain flank pain    History of Present Illness  Patient is 18-year-old white female past medical history for recurrent kidney stones.  She presents to the emergency room at  with a 2 to 3-day history of fevers chills nausea vomiting diarrhea and abdominal and flank pain.  She has a history of multiple kidney stones all of which she states she is been able to pass on her own previously.  She was evaluated in the emergency room the CAT scan tonight and found to have a hydronephrosis on the left with an obstructing 5 mm stone about detention down the ureter.  She also had noted on the CAT scan a \"tiny but slightly abnormal amount of pericardial fluid\" this was not noted on previous examinations.  The emergency room provider requested she be transferred here because they did not have cardiology tonight.  The patient is asymptomatic has never had anything like this denies any swelling denies any chest pain.  I believe this is a nonspecific finding however they insisted on her being transferred here for cardiology to evaluate.  She was also noted to be septic from her urinary tract infection and needs a urologist which they did have.  Her white count was 30,000 her creatinine was slightly elevated at 1.68 BUN was 17 potassium is also slightly low at 3.1.  Her EKG is unremarkable for acute changes.  She has been transferred here for further evaluation and management she was febrile up to about 102 in their emergency room she is now however afebrile.        Review of Systems   14 point review of systems negative except for as per HPI    Otherwise complete ROS reviewed and negative except as mentioned in the HPI.    Past Medical History:   Past Medical History:   Diagnosis Date   • Infectious mononucleosis  " "  • Kidney stone      Past Surgical History:No past surgical history on file.  Social History:  reports that she has been smoking.  She has never used smokeless tobacco. She reports that she uses drugs. Drug: Marijuana. She reports that she does not drink alcohol.    Family History:   Positive for hypertension in both parents  Positive for cardiovascular disease in her maternal grandparents    Allergies:  No Known Allergies  Medications:  Prior to Admission medications    Not on File     Objective     Vital Signs: /86 (BP Location: Right arm, Patient Position: Sitting)   Pulse 99   Temp 98.3 °F (36.8 °C) (Oral)   Resp 18   Ht 165.1 cm (65\")   Wt 86.6 kg (191 lb)   LMP 04/27/2019 (Within Days)   SpO2 100%   Breastfeeding? No   BMI 31.78 kg/m²   Physical Exam  Gen.:  Well-nourished well-developed white female in no acute distress  HEENT: Atraumatic, normocephalic.  Pupils equal, round, and reactive to light. Extraocular movements intact.  Sclerae anicteric.  TMs negative.  Mucous membranes moist.  Neck is supple without lymphadenopathy.  No JVD is noted.  No carotid bruits are auscultated.  Oropharynx is without erythema or exudate.   Chest: Clear to auscultation and percussion.  CV: Regular rate and rhythm.  Normal S1-S2.  No gallops, murmurs. or rubs.  Abdomen: Soft, slightly tender left lower quadrant, nondistended.  Active bowel sounds,  No hepatosplenomegaly.  No masses.  No hernias.  Positive CVA tenderness on the left  Extremities: No clubbing, edema, or cyanosis.  Capillary refill is normal.  Pulses are 2+ and symmetric at radial and dorsalis pedis.  Posterior tibial pulses are intact and 2+ palpable.  Neuro: Patient is awake, alert, and oriented ×3.  Cranial nerves II through XII are grossly intact.  Motor is 5 out of 5 bilaterally.  DTRs are 2+ and symmetric bilaterally. Sensory exam is nonfocal  Skin: Warm, dry, and intact.  No evidence of breakdown.  Sensorium: Normal thought and " affect    Nursing notes and vital signs reviewed        Results Reviewed:  Lab Results (last 24 hours)     ** No results found for the last 24 hours. **        Imaging Results (last 24 hours)     ** No results found for the last 24 hours. **        I have personally reviewed and interpreted the radiology studies and ECG obtained at time of admission.     Assessment / Plan     Assessment:   Active Hospital Problems    Diagnosis   • **Acute pyelonephritis   • Pericardial effusion   • Sepsis (CMS/HCC)   • Ureteral stone with hydronephrosis   • Hypokalemia   • Pyelonephritis   1.  Acute pyelonephritis with obstructive uropathy plan is to admit patient [facility] will [facility] these will reculture blood and urine.  We will continue IV fluids for hydration Rocephin has been initiated will continue that.  We will also consult urology as well as obtain a renal ultrasound in the morning.  2.  Sepsis due to pyelonephritis culture blood and urine fluid bolus initiated antibiotics we will repeat lactic acid it was normal at the [facility] facility Tylenol for fever antibiotics as outlined above.  3.  Pericardial effusion uncertain significance check 2D echo depending on findings consider cardiology consultation.  4.  Ureteral stone with hydronephrosis causing acute kidney injury IV fluids for hydration consult urology will check renal ultrasound will make patient n.p.o. until urology sees just in case they want to take her to the OR.  5.  Hypokalemia replace potassium check magnesium  6.  Acute kidney injury probably due to ureteral stone and dehydration will aggressively fluid resuscitate check renal ultrasound urology consult as above.  I do not believe she needs a nephrology consult however would consider if it does not improve or worsens.  7.  Tobacco habituation encouraged smoking cessation patient declines nicotine patch.    Patient seen after midnight         Code Status:  Full     I discussed the patient's findings and my recommendations with the patient and her boyfriend.  Her mother is her healthcare surrogate should she not be able to speak for herself.    Estimated length of stay 2 to 3 days    Td Mcclendon MD   06/11/19   4:08 AM

## 2019-06-11 NOTE — PROGRESS NOTES
[facility] Medicine Services  INPATIENT PROGRESS NOTE    Patient Name: Radha Diaz  Date of Admission: 6/11/2019  Today's Date: 06/11/19  Length of Stay: 0  Primary Care Physician: Provider, No Known    Subjective   Chief Complaint: follow up hydronephrosis  HPI   Pt is awake and alert. Complains of left flank pain. States she has been able to pass her kidney stones in the past but not now. No chest pain. States she is not short of breath. She was sent here from [facility] with possible pericardial effusion.     Review of Systems   All pertinent negatives and positives are as above. All other systems have been reviewed and are negative unless otherwise stated.     Objective    Temp:  [98.2 °F (36.8 °C)-98.3 °F (36.8 °C)] 98.2 °F (36.8 °C)  Heart Rate:  [] 92  Resp:  [12-18] 18  BP: ()/(37-86) 110/60  Physical Exam   Constitutional: She is oriented to person, place, and time. She appears well-developed and well-nourished.   HENT:   Head: Normocephalic and atraumatic.   Eyes: Conjunctivae and EOM are normal. Pupils are equal, round, and reactive to light.   Neck: Neck supple. No JVD present. No thyromegaly present.   Cardiovascular: Normal rate, regular rhythm, normal heart sounds and intact distal pulses. Exam reveals no gallop and no friction rub.   No murmur heard.  Sinus 87-92   Pulmonary/Chest: Effort normal and breath sounds normal. No respiratory distress. She has no wheezes. She has no rales. She exhibits no tenderness.   Abdominal: Soft. Bowel sounds are normal. She exhibits no distension. There is no tenderness. There is no rebound and no guarding.   Musculoskeletal: Normal range of motion. She exhibits no edema, tenderness or deformity.   Lymphadenopathy:     She has no cervical adenopathy.   Neurological: She is alert and oriented to person, place, and time. She displays normal reflexes. No cranial nerve deficit. She exhibits normal muscle tone.  "  Skin: Skin is warm and dry. No rash noted.   Psychiatric: She has a normal mood and affect. Her behavior is normal. Judgment and thought content normal.     Results Review:  I have reviewed the labs, radiology results, and diagnostic studies.    Laboratory Data:   Results from last 7 days   Lab Units 06/11/19  0430   WBC 10*3/mm3 24.81*   HEMOGLOBIN g/dL 12.4   HEMATOCRIT % 35.7*   PLATELETS 10*3/mm3 282        Results from last 7 days   Lab Units 06/11/19  0430   SODIUM mmol/L 138   POTASSIUM mmol/L 3.0*   CHLORIDE mmol/L 106   CO2 mmol/L 25.0   BUN mg/dL 15   CREATININE mg/dL 0.94   CALCIUM mg/dL 8.7   BILIRUBIN mg/dL 0.4*   ALK PHOS U/L 86   ALT (SGPT) U/L 24   AST (SGOT) U/L 17   GLUCOSE mg/dL 133*     Radiology Data:   Imaging Results (last 24 hours)     Procedure Component Value Units Date/Time    FL Retrograde Pyelogram In OR [[id number]] Updated:  06/11/19 1334    FL Retrograde Pyelogram In OR [[id number]] Updated:  06/11/19 1334          I have reviewed the patient's current medications.     Assessment/Plan     Active Hospital Problems    Diagnosis   • **Acute pyelonephritis   • Pericardial effusion   • Sepsis (CMS/HCC)   • Ureteral stone with hydronephrosis   • Hypokalemia   • Pyelonephritis     Plan:  1. Dr. Randle to take to the OR later today.   2. 2d echo is pending.   3. Rocephin day 2  4. Awaiting urine culture and blood culture.     Discharge Planning: I expect the patient to be discharged to home in 1 days.    MERI Scott   06/11/19   2:09 PM     I personally evaluated and examined the patient in conjunction with MERI Foreman and agree with the assessment, treatment plan, and disposition of the patient as recorded by her. My history, exam, and further recommendations are:       Echo reviewed, shows \"trivial\" pericardial effusion.  Reviewed images personally the effusion is small.  Discussed with reviewing cardiologist who indicates no follow-up needed unless clinical suspicion " or change in status arises.    Likely d/c patient in AM as her best friends  is tomorrow in [place].  Will discharge with 14 days of ciprofloxacin.  Confirmed multiple contact numbers with patient incase cultures show resistant organism.      Yousif Hirsch MD  19  9:29 PM

## 2019-06-12 ENCOUNTER — PREP FOR SURGERY (OUTPATIENT)
Dept: OTHER | Facility: HOSPITAL | Age: 19
End: 2019-06-12

## 2019-06-12 ENCOUNTER — APPOINTMENT (OUTPATIENT)
Dept: GENERAL RADIOLOGY | Facility: HOSPITAL | Age: 19
End: 2019-06-12

## 2019-06-12 VITALS
HEIGHT: 65 IN | HEART RATE: 84 BPM | DIASTOLIC BLOOD PRESSURE: 37 MMHG | RESPIRATION RATE: 18 BRPM | TEMPERATURE: 99.2 F | BODY MASS INDEX: 32.99 KG/M2 | OXYGEN SATURATION: 100 % | SYSTOLIC BLOOD PRESSURE: 110 MMHG | WEIGHT: 198 LBS

## 2019-06-12 DIAGNOSIS — N13.2 URETERAL STONE WITH HYDRONEPHROSIS: Primary | ICD-10-CM

## 2019-06-12 LAB
ANION GAP SERPL CALCULATED.3IONS-SCNC: 7 MMOL/L (ref 4–13)
BUN BLD-MCNC: 11 MG/DL (ref 5–21)
BUN/CREAT SERPL: 13.3 (ref 7–25)
BURR CELLS BLD QL SMEAR: ABNORMAL
CALCIUM SPEC-SCNC: 8.9 MG/DL (ref 8.4–10.4)
CHLORIDE SERPL-SCNC: 108 MMOL/L (ref 98–110)
CO2 SERPL-SCNC: 25 MMOL/L (ref 24–31)
CREAT BLD-MCNC: 0.83 MG/DL (ref 0.5–1.4)
DEPRECATED RDW RBC AUTO: 44.1 FL (ref 40–54)
ERYTHROCYTE [DISTWIDTH] IN BLOOD BY AUTOMATED COUNT: 13.8 % (ref 12–15)
GFR SERPL CREATININE-BSD FRML MDRD: 89 ML/MIN/1.73
GLUCOSE BLD-MCNC: 102 MG/DL (ref 70–100)
HCT VFR BLD AUTO: 35.1 % (ref 37–47)
HGB BLD-MCNC: 11.8 G/DL (ref 12–16)
LYMPHOCYTES # BLD MANUAL: 2.03 10*3/MM3 (ref 0.72–4.86)
LYMPHOCYTES NFR BLD MANUAL: 4 % (ref 4–12)
LYMPHOCYTES NFR BLD MANUAL: 9.1 % (ref 15–45)
MCH RBC QN AUTO: 29.4 PG (ref 28–32)
MCHC RBC AUTO-ENTMCNC: 33.6 G/DL (ref 33–36)
MCV RBC AUTO: 87.5 FL (ref 82–98)
MONOCYTES # BLD AUTO: 0.89 10*3/MM3 (ref 0.19–1.3)
NEUTROPHILS # BLD AUTO: 19.35 10*3/MM3 (ref 1.87–8.4)
NEUTROPHILS NFR BLD MANUAL: 77.8 % (ref 39–78)
NEUTS BAND NFR BLD MANUAL: 9.1 % (ref 0–10)
NEUTS VAC BLD QL SMEAR: ABNORMAL
PLAT MORPH BLD: NORMAL
PLATELET # BLD AUTO: 270 10*3/MM3 (ref 130–400)
PMV BLD AUTO: 10.5 FL (ref 6–12)
POTASSIUM BLD-SCNC: 3.8 MMOL/L (ref 3.5–5.3)
RBC # BLD AUTO: 4.01 10*6/MM3 (ref 4.2–5.4)
SODIUM BLD-SCNC: 140 MMOL/L (ref 135–145)
TOXIC GRANULATION: ABNORMAL
WBC NRBC COR # BLD: 22.28 10*3/MM3 (ref 4.8–10.8)

## 2019-06-12 PROCEDURE — 71045 X-RAY EXAM CHEST 1 VIEW: CPT

## 2019-06-12 PROCEDURE — 85007 BL SMEAR W/DIFF WBC COUNT: CPT | Performed by: NURSE PRACTITIONER

## 2019-06-12 PROCEDURE — G0378 HOSPITAL OBSERVATION PER HR: HCPCS

## 2019-06-12 PROCEDURE — 99225 PR SBSQ OBSERVATION CARE/DAY 25 MINUTES: CPT | Performed by: UROLOGY

## 2019-06-12 PROCEDURE — 80048 BASIC METABOLIC PNL TOTAL CA: CPT | Performed by: NURSE PRACTITIONER

## 2019-06-12 PROCEDURE — 85025 COMPLETE CBC W/AUTO DIFF WBC: CPT | Performed by: NURSE PRACTITIONER

## 2019-06-12 RX ORDER — TAMSULOSIN HYDROCHLORIDE 0.4 MG/1
0.4 CAPSULE ORAL DAILY
Qty: 30 CAPSULE | Refills: 0 | Status: SHIPPED | OUTPATIENT
Start: 2019-06-12 | End: 2019-11-11

## 2019-06-12 RX ORDER — SODIUM CHLORIDE 0.9 % (FLUSH) 0.9 %
1-10 SYRINGE (ML) INJECTION AS NEEDED
Status: CANCELLED | OUTPATIENT
Start: 2019-06-12

## 2019-06-12 RX ORDER — SODIUM CHLORIDE 9 MG/ML
100 INJECTION, SOLUTION INTRAVENOUS CONTINUOUS
Status: CANCELLED | OUTPATIENT
Start: 2019-06-12

## 2019-06-12 RX ORDER — SODIUM CHLORIDE 0.9 % (FLUSH) 0.9 %
3 SYRINGE (ML) INJECTION EVERY 12 HOURS SCHEDULED
Status: CANCELLED | OUTPATIENT
Start: 2019-06-12

## 2019-06-12 RX ORDER — OXYBUTYNIN CHLORIDE 5 MG/1
5 TABLET, EXTENDED RELEASE ORAL DAILY
Qty: 30 TABLET | Refills: 0 | Status: SHIPPED | OUTPATIENT
Start: 2019-06-12 | End: 2019-06-20

## 2019-06-12 RX ORDER — CIPROFLOXACIN 500 MG/1
500 TABLET, FILM COATED ORAL 2 TIMES DAILY
Qty: 28 TABLET | Refills: 0 | Status: SHIPPED | OUTPATIENT
Start: 2019-06-12 | End: 2019-06-20

## 2019-06-12 RX ORDER — HYDROCODONE BITARTRATE AND ACETAMINOPHEN 7.5; 325 MG/1; MG/1
1 TABLET ORAL EVERY 6 HOURS PRN
Qty: 12 TABLET | Refills: 0 | Status: SHIPPED | OUTPATIENT
Start: 2019-06-12 | End: 2019-06-20

## 2019-06-12 RX ORDER — ONDANSETRON 4 MG/1
4 TABLET, FILM COATED ORAL EVERY 6 HOURS PRN
Qty: 15 TABLET | Refills: 0 | Status: SHIPPED | OUTPATIENT
Start: 2019-06-12 | End: 2019-06-20

## 2019-06-12 RX ORDER — BUPIVACAINE HCL/0.9 % NACL/PF 0.1 %
2 PLASTIC BAG, INJECTION (ML) EPIDURAL ONCE
Status: CANCELLED | OUTPATIENT
Start: 2019-06-12 | End: 2019-06-12

## 2019-06-12 RX ADMIN — SODIUM CHLORIDE 100 ML/HR: 9 INJECTION, SOLUTION INTRAVENOUS at 00:44

## 2019-06-12 NOTE — DISCHARGE SUMMARY
[facility] Medicine Services  DISCHARGE SUMMARY       Date of Admission: 2019  Date of Discharge:  2019  Primary Care Physician: Provider, No Known    Presenting Problem/History of Present Illness:  Left flank pain; pericardial effusion    Final Discharge Diagnoses:  • **Acute pyelonephritis   • Pericardial effusion   • Sepsis (CMS/HCC)   • Ureteral stone with hydronephrosis   • Hypokalemia   • Pyelonephritis     Consults:   1. Dr. Ayo Randle-urology    Procedures Performed:   1. Cystoscopy with LEFT RETROGRADE PYELOGRAM AND URETERAL STENT INSERTION 19    Pertinent Test Results:   Imaging Results (last 7 days)     ** No results found for the last 168 hours. **      Radha Diaz   Echocardiogram   Order# [id number]   Reading physician: Ayo Oliva MD Ordering physician: Td Mcclendon MD Study date: 19   Patient Information     Patient Name  Radha Diaz MRN  [id number] Sex  Female  (Age)  2000 (18 y.o.)   Sedation Narrator Report     Sedation Narrator Report   Interpretation Summary     · Left ventricular systolic function is normal. Estimated EF appears to be in the range of 61 - 65%.  · Left ventricular diastolic function is normal.  · No hemodynamically significant valvular abnormalities identified on the study.  · A trivial pericardial effusion is noted with no hemodynamic consequence.          Lab Results (last 7 days)     Procedure Component Value Units Date/Time    Urine Culture - Urine, Urine, Clean Catch [[id number]]  (Normal) Collected:  19 0453    Specimen:  Urine, Clean Catch Updated:  19 0641     Urine Culture No growth at 24 hours    Basic Metabolic Panel [[id number]]  (Abnormal) Collected:  19 0305    Specimen:  Blood Updated:  19 0502     Glucose 102 mg/dL      BUN 11 mg/dL      Creatinine 0.83 mg/dL      Sodium 140 mmol/L      Potassium 3.8 mmol/L      Chloride 108 mmol/L      CO2 25.0 mmol/L       Calcium 8.9 mg/dL      eGFR Non African Amer 89 mL/min/1.73      BUN/Creatinine Ratio 13.3     Anion Gap 7.0 mmol/L     Narrative:       GFR Normal >60  Chronic Kidney Disease <60  Kidney Failure <15    CBC & Differential [[id number]] Collected:  06/12/19 0305    Specimen:  Blood Updated:  06/12/19 0459    Narrative:       The following orders were created for panel order CBC & Differential.  Procedure                               Abnormality         Status                     ---------                               -----------         ------                     CBC Auto Differential[[id number]]        Abnormal            Final result                 Please view results for these tests on the individual orders.    CBC Auto Differential [[id number]]  (Abnormal) Collected:  06/12/19 0305    Specimen:  Blood Updated:  06/12/19 0459     WBC 22.28 10*3/mm3      RBC 4.01 10*6/mm3      Hemoglobin 11.8 g/dL      Hematocrit 35.1 %      MCV 87.5 fL      MCH 29.4 pg      MCHC 33.6 g/dL      RDW 13.8 %      RDW-SD 44.1 fl      MPV 10.5 fL      Platelets 270 10*3/mm3     Manual Differential [[id number]]  (Abnormal) Collected:  06/12/19 0305    Specimen:  Blood Updated:  06/12/19 0459     Neutrophil % 77.8 %      Lymphocyte % 9.1 %      Monocyte % 4.0 %      Bands %  9.1 %      Neutrophils Absolute 19.35 10*3/mm3      Lymphocytes Absolute 2.03 10*3/mm3      Monocytes Absolute 0.89 10*3/mm3      Crenated RBC's Mod/2+     Toxic Granulation Slight/1+     Vacuolated Neutrophils Slight/1+     Platelet Morphology Normal    Troponin [[id number]]  (Normal) Collected:  06/11/19 1038    Specimen:  Blood Updated:  06/11/19 1230     Troponin I <0.012 ng/mL     Lactic Acid, Reflex [[id number]]  (Normal) Collected:  06/11/19 0937    Specimen:  Blood Updated:  06/11/19 1011     Lactate 2.0 mmol/L     Lactic Acid, Reflex Timer (This will reflex a repeat order 3-3:15 hours after ordered.) [[id number]] Collected:  06/11/19 0430    Specimen:  Blood  Updated:  06/11/19 0846     Extra Tube Hold for add-ons.     Comment: Auto resulted.       C-reactive Protein [[id number]]  (Abnormal) Collected:  06/11/19 0430    Specimen:  Blood Updated:  06/11/19 0726     C-Reactive Protein 34.94 mg/dL     Urinalysis, Microscopic Only - Urine, Clean Catch [[id number]]  (Abnormal) Collected:  06/11/19 0453    Specimen:  Urine, Clean Catch Updated:  06/11/19 0554     RBC, UA 3-5 /HPF      WBC, UA 31-50 /HPF      Bacteria, UA Trace /HPF      Squamous Epithelial Cells, UA 3-6 /HPF      Methodology Manual Light Microscopy    Urinalysis With Culture If Indicated - Urine, Clean Catch [[id number]]  (Abnormal) Collected:  06/11/19 0453    Specimen:  Urine, Clean Catch Updated:  06/11/19 0554     Color, UA Yellow     Appearance, UA Clear     pH, UA 5.5     Specific Gravity, UA 1.008     Glucose, UA Negative     Ketones, UA Negative     Bilirubin, UA Negative     Blood, UA Moderate (2+)     Protein, UA Trace     Leuk Esterase, UA Large (3+)     Nitrite, UA Negative     Urobilinogen, UA 0.2 E.U./dL    Manual Differential [[id number]]  (Abnormal) Collected:  06/11/19 0430    Specimen:  Blood Updated:  06/11/19 0552     Neutrophil % 77.0 %      Lymphocyte % 7.0 %      Monocyte % 3.0 %      Eosinophil % 2.0 %      Bands %  10.0 %      Myelocyte % 1.0 %      Neutrophils Absolute 21.58 10*3/mm3      Lymphocytes Absolute 1.74 10*3/mm3      Monocytes Absolute 0.74 10*3/mm3      Eosinophils Absolute 0.50 10*3/mm3      Anisocytosis Slight/1+     Crenated RBC's Slight/1+     Poikilocytes Slight/1+     WBC Morphology Normal     Platelet Morphology Normal    CBC & Differential [[id number]] Collected:  06/11/19 0430    Specimen:  Blood Updated:  06/11/19 0552    Narrative:       The following orders were created for panel order CBC & Differential.  Procedure                               Abnormality         Status                     ---------                               -----------         ------                      CBC Auto Differential[[id number]]        Abnormal            Final result                 Please view results for these tests on the individual orders.    CBC Auto Differential [[id number]]  (Abnormal) Collected:  06/11/19 0430    Specimen:  Blood Updated:  06/11/19 0552     WBC 24.81 10*3/mm3      RBC 4.14 10*6/mm3      Hemoglobin 12.4 g/dL      Hematocrit 35.7 %      MCV 86.2 fL      MCH 30.0 pg      MCHC 34.7 g/dL      RDW 13.4 %      RDW-SD 42.2 fl      MPV 10.0 fL      Platelets 282 10*3/mm3     TSH [[id number]]  (Normal) Collected:  06/11/19 0430    Specimen:  Blood Updated:  06/11/19 0548     TSH 2.210 mIU/mL     Lactic Acid, Plasma [[id number]]  (Abnormal) Collected:  06/11/19 0430    Specimen:  Blood Updated:  06/11/19 0541     Lactate 2.2 mmol/L     Procalcitonin [[id number]]  (Abnormal) Collected:  06/11/19 0430    Specimen:  Blood Updated:  06/11/19 0538     Procalcitonin 2.15 ng/mL     Narrative:       SIRS, sepsis, severe sepsis, and septic shock are categorized according to the criteria of the consensus conference of the American College of Chest Physicians/Society of Critical Care Medicine.    PCT < 0.5 ng/mL     Systemic infection (sepsis) is not likely.    PCT >0.5 and < 2.0 ng/mL Systemic infection (sepsis) is possible, but other conditions are known to elevate PCT as well.    PCT > 2.0 ng/mL     Systemic infection (sepsis) is likely, unless other causes are known.      PCT > 10.0 ng/mL    Important systemic inflammatory response, almost exclusively due to severe bacterial sepsis or septic shock.    PCT values of < 0.5 ng/mL do not exclude an infection, because localized infections (without systemic signs) may be associated with such low concentrations, or a systemic infection in its initial stages (<6 hours).  Increased PCT can occur without infection.  PCT concentrations between 0.5 and 2.0 ng/mL should be interpreted taking into account the patients history.  It is recommended to retest  PCT within 6-24 hours if any concentrations < 2.0 ng/mL are obtained.    Troponin [[id number]]  (Normal) Collected:  06/11/19 0430    Specimen:  Blood Updated:  06/11/19 0530     Troponin I <0.012 ng/mL     Comprehensive Metabolic Panel [[id number]]  (Abnormal) Collected:  06/11/19 0430    Specimen:  Blood Updated:  06/11/19 0521     Glucose 133 mg/dL      BUN 15 mg/dL      Creatinine 0.94 mg/dL      Sodium 138 mmol/L      Potassium 3.0 mmol/L      Chloride 106 mmol/L      CO2 25.0 mmol/L      Calcium 8.7 mg/dL      Total Protein 6.1 g/dL      Albumin 3.00 g/dL      ALT (SGPT) 24 U/L      AST (SGOT) 17 U/L      Alkaline Phosphatase 86 U/L      Total Bilirubin 0.4 mg/dL      eGFR Non African Amer 77 mL/min/1.73      Globulin 3.1 gm/dL      A/G Ratio 1.0 g/dL      BUN/Creatinine Ratio 16.0     Anion Gap 7.0 mmol/L     Narrative:       GFR Normal >60  Chronic Kidney Disease <60  Kidney Failure <15    Magnesium [[id number]]  (Normal) Collected:  06/11/19 0430    Specimen:  Blood Updated:  06/11/19 0521     Magnesium 1.9 mg/dL     Phosphorus [[id number]]  (Normal) Collected:  06/11/19 0430    Specimen:  Blood Updated:  06/11/19 0521     Phosphorus 2.6 mg/dL     Sedimentation Rate [[id number]]  (Abnormal) Collected:  06/11/19 0430    Specimen:  Blood Updated:  06/11/19 0521     Sed Rate 34 mm/hr     aPTT [[id number]]  (Normal) Collected:  06/11/19 0430    Specimen:  Blood Updated:  06/11/19 0511     PTT 31.5 seconds         Hospital Course:  The patient is a 18 y.o. female who does not really follow with her primary care provider.  She has a past medical history significant kidney stones and obesity.  Patient presented to the emergency department at [facility] on 6/10 with significant left pain and fevers chills.  She had associated nausea vomiting and diarrhea as well.  Emergency room at [facility] show hydronephrosis of the left and obstructing 5 mm stone.  Also noted to have a tiny but slightly  "abnormal amount of pericardial fluid.  She was transferred here for further evaluation and management.    She was noted to have white count of 30,000 and BUN 70 with creatinine 1.68 at the outlying facility.  He was given IV fluid resuscitation as well as IV Rocephin.  Urine cultures from [facility] revealing gram-negative rods at present.  Given the fact that she met sepsis criteria she was continued on IV fluids.  She was seen in consultation by Dr. Ayo Randle with urology.  She underwent cystoscopy with ureteroscopy on the left and stent placement on 2019.  Urine cultures here are not growing any bacteria.  Blood cultures are negative today.  Patient's pain is much improved.  Her white count went from 24,000-22,000.  She has been placed on oxybutynin as well and Flomax.  She will continue this post discharge.  In December with 2 weeks of ciprofloxacin.  We will monitor urine cultures from [facility] as our culture is not growing anything in 24 hours.     Given the fact that she had a trivial pericardial effusion 2D echocardiogram was obtained.  The study revealed a trivial pericardial effusion without hemodynamic significance.  [place] did discuss with Dr. Oliva who interpreted her 2D echocardiogram and states that it was not significant.     Unfortunately, patient has a  from his friend to attend today.  She is stable and is been afebrile.  We will give her some pain medication to go home with. She has been instructed to not drive. We have set her up with [facility] to establish care. She is stable for discharge.     Physical Exam on Discharge:  BP (!) 110/37 (BP Location: Right arm, Patient Position: Lying)   Pulse 84   Temp 99.2 °F (37.3 °C) (Oral)   Resp 18   Ht 165.1 cm (65\")   Wt 89.8 kg (198 lb)   LMP 2019 (Within Days) Comment: neg hcg  SpO2 100%   Breastfeeding? No   BMI 32.95 kg/m²   Physical Exam   Constitutional: She is oriented to person, place, and " time. She appears well-developed and well-nourished.   HENT:   Head: Normocephalic and atraumatic.   Eyes: Conjunctivae and EOM are normal. Pupils are equal, round, and reactive to light.   Neck: Neck supple. No JVD present. No thyromegaly present.   Cardiovascular: Normal rate, regular rhythm, normal heart sounds and intact distal pulses. Exam reveals no gallop and no friction rub.   No murmur heard.  Sinus 76-99   Pulmonary/Chest: Effort normal and breath sounds normal. No respiratory distress. She has no wheezes. She has no rales. She exhibits no tenderness.   Abdominal: Soft. She exhibits no distension. There is no tenderness. There is no rebound and no guarding.   Less flank tenderness.    Musculoskeletal: Normal range of motion. She exhibits no edema, tenderness or deformity.   Lymphadenopathy:     She has no cervical adenopathy.   Neurological: She is alert and oriented to person, place, and time. She displays normal reflexes. No cranial nerve deficit. She exhibits normal muscle tone.   Skin: Skin is warm and dry. No rash noted.   Psychiatric: She has a normal mood and affect. Her behavior is normal. Judgment and thought content normal.     Condition on Discharge: stable    Discharge Disposition:  Home or Self Care    Discharge Medications:     Discharge Medications      New Medications      Instructions Start Date   ciprofloxacin 500 MG tablet  Commonly known as:  CIPRO   500 mg, Oral, 2 Times Daily      HYDROcodone-acetaminophen 7.5-325 MG per tablet  Commonly known as:  NORCO   1 tablet, Oral, Every 6 Hours PRN      ondansetron 4 MG tablet  Commonly known as:  ZOFRAN   4 mg, Oral, Every 6 Hours PRN      oxybutynin XL 5 MG 24 hr tablet  Commonly known as:  DITROPAN-XL   5 mg, Oral, Daily      tamsulosin 0.4 MG capsule 24 hr capsule  Commonly known as:  FLOMAX   0.4 mg, Oral, Daily           Discharge Diet:   Diet Instructions     Diet: Regular; Thin      Discharge Diet:  Regular    Fluid Consistency:  Thin         Activity at Discharge:   Activity Instructions     Activity as Tolerated            Follow-up Appointments:   Future Appointments   Date Time Provider Department Center   2019  1:30 PM  [facility] PAT 31 RM 2  [facility] PAT [facility]   [facility] in 1 week  Dr. Randle will perform ureteroscopy in 2 weeks.     Test Results Pending at Discharge: final urine culture.     MERI Scott  19  3:40 PM    Time: 25 minutes.     I personally discussed the patient in conjunction with MERI Foreman and agree with the assessment, treatment plan, and disposition of the patient as recorded by her. My history, exam, and further recommendations are:     Patient will need final urine culture followed up.  Patient discharged as she needed to get to her friends  in [place].  Patient feeling well.      Yousif Hirsch MD  19  9:28 PM

## 2019-06-12 NOTE — PROGRESS NOTES
Subjective    Ms. Diaz is 18 y.o. female    Chief Complaint: Pyelonephritis/Left Ureteral Stone    History of Present Illness     Pain much improved, + Frequency and + Urgency    The following portions of the patient's history were reviewed and updated as appropriate: allergies, current medications, past family history, past medical history, past social history, past surgical history and problem list.    Review of Systems   Constitutional: Negative for chills and fever.   Gastrointestinal: Negative for abdominal pain, anal bleeding and blood in stool.   Genitourinary: Positive for frequency and urgency. Negative for dysuria and hematuria.         Current Facility-Administered Medications:   •  acetaminophen (TYLENOL) tablet 650 mg, 650 mg, Oral, Q4H PRN, Td Mcclendon MD  •  cefTRIAXone (ROCEPHIN) 2 g/100 mL 0.9% NS VTB (BRITTNEY), 2 g, Intravenous, Q24H, Td Mcclendon MD, Stopped at 06/11/19 2037  •  HYDROcodone-acetaminophen (NORCO) 7.5-325 MG per tablet 1 tablet, 1 tablet, Oral, Q4H PRN, Td Mcclendon MD, 1 tablet at 06/11/19 0744  •  HYDROmorphone (DILAUDID) injection 0.5 mg, 0.5 mg, Intravenous, Q2H PRN, 0.5 mg at 06/11/19 2204 **AND** naloxone (NARCAN) injection 0.4 mg, 0.4 mg, Intravenous, Q5 Min PRN, Td Mcclendon MD  •  ondansetron (ZOFRAN) tablet 4 mg, 4 mg, Oral, Q6H PRN **OR** ondansetron (ZOFRAN) injection 4 mg, 4 mg, Intravenous, Q6H PRN, Td Mcclendon MD  •  oxybutynin XL (DITROPAN-XL) 24 hr tablet 5 mg, 5 mg, Oral, Daily, Ayo Randle MD, 5 mg at 06/11/19 1441  •  sodium chloride 0.9 % flush 3 mL, 3 mL, Intravenous, Q12H, Td Mcclendon MD, 3 mL at 06/11/19 2008  •  sodium chloride 0.9 % flush 3-10 mL, 3-10 mL, Intravenous, PRN, Td Mcclendon MD  •  sodium chloride 0.9 % infusion, 100 mL/hr, Intravenous, Continuous, Ayo Randle MD, Last Rate: 100 mL/hr at 06/12/19 0509, 100 mL/hr at 06/12/19 0509  •  tamsulosin (FLOMAX) 24 hr capsule 0.4 mg, 0.4 mg,  "Oral, Daily, Ayo Randle MD, 0.4 mg at 06/11/19 1441    Past Medical History:   Diagnosis Date   • Infectious mononucleosis    • Kidney stone        Past Surgical History:   Procedure Laterality Date   • CYSTOSCOPY W/ URETERAL STENT PLACEMENT Left 6/11/2019    Procedure: CYSTOSCOPY LEFT RETROGRADE PYELOGRAM AND URETERAL STENT INSERTION;  Surgeon: Ayo Randle MD;  Location: [facility];  Service: Urology       Social History     Socioeconomic History   • Marital status: Single     Spouse name: Not on file   • Number of children: Not on file   • Years of education: Not on file   • Highest education level: Not on file   Tobacco Use   • Smoking status: Current Every Day Smoker   • Smokeless tobacco: Never Used   Substance and Sexual Activity   • Alcohol use: No     Frequency: Never   • Drug use: Yes     Types: Marijuana       Family History   Problem Relation Age of Onset   • Hypertension Mother    • Hypertension Father    • Heart disease Maternal Grandmother    • Heart disease Maternal Grandfather        Objective    BP (!) 110/37 (BP Location: Right arm, Patient Position: Lying)   Pulse 84   Temp 99.2 °F (37.3 °C) (Oral)   Resp 18   Ht 165.1 cm (65\")   Wt 89.8 kg (198 lb)   LMP 04/27/2019 (Within Days) Comment: neg hcg  SpO2 100%   Breastfeeding? No   BMI 32.95 kg/m²     Intake/Output Summary (Last 24 hours) at 6/12/2019 0623  Last data filed at 6/12/2019 0441  Gross per 24 hour   Intake 2883 ml   Output 1400 ml   Net 1483 ml       Physical Exam   Constitutional: She is oriented to person, place, and time. She appears well-developed and well-nourished. No distress.   Pulmonary/Chest: Effort normal.   Abdominal: Soft. She exhibits no distension and no mass. There is no tenderness. There is no rebound and no guarding. No hernia.   Genitourinary:   Genitourinary Comments: Slight L CVAT   Neurological: She is alert and oriented to person, place, and time.   Skin: Skin is warm and dry. She is " not diaphoretic.   Psychiatric: She has a normal mood and affect.   Vitals reviewed.      Results Review:   I reviewed the patient's new clinical results.              Imaging Results (last 24 hours)     Procedure Component Value Units Date/Time    XR Chest 1 View [[id number]] Updated:  06/12/19 0439    FL Retrograde Pyelogram In OR [[id number]] Collected:  06/11/19 1520     Updated:  06/11/19 1525    Narrative:       HISTORY: Hydronephrosis     Retrograde pyelogram: 7 spot views of the abdomen are obtained during  retrograde pyelogram performed by Dr. Randle.      Fluoroscopy Time 0.9 minutes. Dose area product measuring 295 uGym2     There is catheterization of the left renal collecting system. A left  ureteral stent is placed appropriately. No right renal collecting system  dilatation is identified. There is no contrast extravasation.     Please refer to operative report for further details.  This report was finalized on 06/11/2019 15:22 by Dr. Cesia Jacques MD.    FL Retrograde Pyelogram In OR [[id number]] Updated:  06/11/19 1334            Results for orders placed or performed during the hospital encounter of 06/11/19   Lactic Acid, Plasma   Result Value Ref Range    Lactate 2.2 (C) 0.5 - 2.0 mmol/L   Procalcitonin   Result Value Ref Range    Procalcitonin 2.15 (H) <=0.25 ng/mL   Comprehensive Metabolic Panel   Result Value Ref Range    Glucose 133 (H) 70 - 100 mg/dL    BUN 15 5 - 21 mg/dL    Creatinine 0.94 0.50 - 1.40 mg/dL    Sodium 138 135 - 145 mmol/L    Potassium 3.0 (L) 3.5 - 5.3 mmol/L    Chloride 106 98 - 110 mmol/L    CO2 25.0 24.0 - 31.0 mmol/L    Calcium 8.7 8.4 - 10.4 mg/dL    Total Protein 6.1 (L) 6.3 - 8.7 g/dL    Albumin 3.00 (L) 3.50 - 5.00 g/dL    ALT (SGPT) 24 0 - 54 U/L    AST (SGOT) 17 7 - 45 U/L    Alkaline Phosphatase 86 50 - 130 U/L    Total Bilirubin 0.4 (L) 0.6 - 1.4 mg/dL    eGFR Non African Amer 77 >60 mL/min/1.73    Globulin 3.1 gm/dL    A/G Ratio 1.0 (L) 1.1 - 2.5 g/dL     BUN/Creatinine Ratio 16.0 7.0 - 25.0    Anion Gap 7.0 4.0 - 13.0 mmol/L   aPTT   Result Value Ref Range    PTT 31.5 24.1 - 35.0 seconds   Magnesium   Result Value Ref Range    Magnesium 1.9 1.4 - 2.2 mg/dL   Phosphorus   Result Value Ref Range    Phosphorus 2.6 2.5 - 4.5 mg/dL   TSH   Result Value Ref Range    TSH 2.210 0.470 - 4.680 mIU/mL   Urinalysis With Culture If Indicated - Urine, Clean Catch   Result Value Ref Range    Color, UA Yellow Yellow, Straw    Appearance, UA Clear Clear    pH, UA 5.5 5.0 - 8.0    Specific Gravity, UA 1.008 1.005 - 1.030    Glucose, UA Negative Negative    Ketones, UA Negative Negative    Bilirubin, UA Negative Negative    Blood, UA Moderate (2+) (A) Negative    Protein, UA Trace (A) Negative    Leuk Esterase, UA Large (3+) (A) Negative    Nitrite, UA Negative Negative    Urobilinogen, UA 0.2 E.U./dL 0.2 - 1.0 E.U./dL   Sedimentation Rate   Result Value Ref Range    Sed Rate 34 (H) 0 - 20 mm/hr   C-reactive Protein   Result Value Ref Range    C-Reactive Protein 34.94 (H) 0.00 - 0.99 mg/dL   CBC Auto Differential   Result Value Ref Range    WBC 24.81 (H) 4.80 - 10.80 10*3/mm3    RBC 4.14 (L) 4.20 - 5.40 10*6/mm3    Hemoglobin 12.4 12.0 - 16.0 g/dL    Hematocrit 35.7 (L) 37.0 - 47.0 %    MCV 86.2 82.0 - 98.0 fL    MCH 30.0 28.0 - 32.0 pg    MCHC 34.7 33.0 - 36.0 g/dL    RDW 13.4 12.0 - 15.0 %    RDW-SD 42.2 40.0 - 54.0 fl    MPV 10.0 6.0 - 12.0 fL    Platelets 282 130 - 400 10*3/mm3   Troponin   Result Value Ref Range    Troponin I <0.012 0.000 - 0.034 ng/mL   Troponin   Result Value Ref Range    Troponin I <0.012 0.000 - 0.034 ng/mL   Urinalysis, Microscopic Only - Urine, Clean Catch   Result Value Ref Range    RBC, UA 3-5 (A) None Seen /HPF    WBC, UA 31-50 (A) None Seen /HPF    Bacteria, UA Trace (A) None Seen /HPF    Squamous Epithelial Cells, UA 3-6 (A) None Seen, 0-2 /HPF    Methodology Manual Light Microscopy    Lactic Acid, Reflex Timer (This will reflex a repeat order 3-3:15  hours after ordered.)   Result Value Ref Range    Extra Tube Hold for add-ons.    Lactic Acid, Reflex   Result Value Ref Range    Lactate 2.0 0.5 - 2.0 mmol/L   Basic Metabolic Panel   Result Value Ref Range    Glucose 102 (H) 70 - 100 mg/dL    BUN 11 5 - 21 mg/dL    Creatinine 0.83 0.50 - 1.40 mg/dL    Sodium 140 135 - 145 mmol/L    Potassium 3.8 3.5 - 5.3 mmol/L    Chloride 108 98 - 110 mmol/L    CO2 25.0 24.0 - 31.0 mmol/L    Calcium 8.9 8.4 - 10.4 mg/dL    eGFR Non African Amer 89 >60 mL/min/1.73    BUN/Creatinine Ratio 13.3 7.0 - 25.0    Anion Gap 7.0 4.0 - 13.0 mmol/L   CBC Auto Differential   Result Value Ref Range    WBC 22.28 (H) 4.80 - 10.80 10*3/mm3    RBC 4.01 (L) 4.20 - 5.40 10*6/mm3    Hemoglobin 11.8 (L) 12.0 - 16.0 g/dL    Hematocrit 35.1 (L) 37.0 - 47.0 %    MCV 87.5 82.0 - 98.0 fL    MCH 29.4 28.0 - 32.0 pg    MCHC 33.6 33.0 - 36.0 g/dL    RDW 13.8 12.0 - 15.0 %    RDW-SD 44.1 40.0 - 54.0 fl    MPV 10.5 6.0 - 12.0 fL    Platelets 270 130 - 400 10*3/mm3   Adult Transthoracic Echo Complete W/ Cont if Necessary Per Protocol   Result Value Ref Range    BSA 1.9 m^2    IVSd 1.5 cm    LVIDd 4.1 cm    LVIDs 2.8 cm    LVPWd 1.3 cm    IVS/LVPW 1.1     FS 31.7 %    EDV(Teich) 75.5 ml    ESV(Teich) 30.1 ml    EF(Teich) 60.2 %    EDV(cubed) 70.4 ml    ESV(cubed) 22.4 ml    EF(cubed) 68.2 %    LV mass(C)d 220.1 grams    LV mass(C)dI 113.5 grams/m^2    SV(Teich) 45.4 ml    SI(Teich) 23.4 ml/m^2    SV(cubed) 48.0 ml    SI(cubed) 24.8 ml/m^2    Ao root diam 2.5 cm    Ao root area 4.9 cm^2    LA dimension 3.4 cm    LA/Ao 1.4     LVOT diam 2.1 cm    LVOT area 3.5 cm^2    LVOT area(traced) 3.5 cm^2    LVLd ap4 9.4 cm    EDV(MOD-sp4) 125.0 ml    LVLs ap4 8.3 cm    ESV(MOD-sp4) 48.7 ml    EF(MOD-sp4) 61.0 %    SV(MOD-sp4) 76.3 ml    SI(MOD-sp4) 39.3 ml/m^2    Ao root area (BSA corrected) 1.3     LV Cano Vol (BSA corrected) 64.4 ml/m^2    LV Sys Vol (BSA corrected) 25.1 ml/m^2    MV E max cyrus 92.6 cm/sec    MV A max cyrus  57.7 cm/sec    MV E/A 1.6     MV dec time 0.28 sec    Ao pk jones 146.0 cm/sec    Ao max PG 8.5 mmHg    Ao max PG (full) 4.3 mmHg    Ao V2 mean 111.0 cm/sec    Ao mean PG 5.7 mmHg    Ao mean PG (full) 3.7 mmHg    Ao V2 VTI 31.4 cm    PING(I,A) 2.3 cm^2    PING(I,D) 2.3 cm^2    PING(V,A) 2.4 cm^2    PING(V,D) 2.4 cm^2    LV V1 max PG 4.2 mmHg    LV V1 mean PG 2.0 mmHg    LV V1 max 103.0 cm/sec    LV V1 mean 68.2 cm/sec    LV V1 VTI 21.0 cm    SV(Ao) 154.3 ml    SI(Ao) 79.5 ml/m^2    SV(LVOT) 72.7 ml    SI(LVOT) 37.5 ml/m^2     CV ECHO ALESSANDRA - BZI_BMI 31.8 kilograms/m^2     CV ECHO ALESSANDRA - BSA(HAYCOCK) 2.0 m^2     CV ECHO ALESSANDRA - BZI_METRIC_WEIGHT 86.6 kg     CV ECHO ALESSANDRA - BZI_METRIC_HEIGHT 165.1 cm    Target HR (85%) 171 bpm    Max. Pred. HR (100%) 201 bpm    LA volume 46.3 cm3    LA Volume Index 23.9 mL/m2    Avg E/e' ratio 7.15     Lat Peak E' Jones 13.6 cm/sec    Med Peak E' Jones 12.30 cm/sec   Manual Differential   Result Value Ref Range    Neutrophil % 77.0 39.0 - 78.0 %    Lymphocyte % 7.0 (L) 15.0 - 45.0 %    Monocyte % 3.0 (L) 4.0 - 12.0 %    Eosinophil % 2.0 0.0 - 4.0 %    Bands %  10.0 0.0 - 10.0 %    Myelocyte % 1.0 (H) 0.0 - 0.0 %    Neutrophils Absolute 21.58 (H) 1.87 - 8.40 10*3/mm3    Lymphocytes Absolute 1.74 0.72 - 4.86 10*3/mm3    Monocytes Absolute 0.74 0.19 - 1.30 10*3/mm3    Eosinophils Absolute 0.50 0.00 - 0.70 10*3/mm3    Anisocytosis Slight/1+ None Seen    Crenated RBC's Slight/1+ None Seen    Poikilocytes Slight/1+ None Seen    WBC Morphology Normal Normal    Platelet Morphology Normal Normal   Manual Differential   Result Value Ref Range    Neutrophil % 77.8 39.0 - 78.0 %    Lymphocyte % 9.1 (L) 15.0 - 45.0 %    Monocyte % 4.0 4.0 - 12.0 %    Bands %  9.1 0.0 - 10.0 %    Neutrophils Absolute 19.35 (H) 1.87 - 8.40 10*3/mm3    Lymphocytes Absolute 2.03 0.72 - 4.86 10*3/mm3    Monocytes Absolute 0.89 0.19 - 1.30 10*3/mm3    Crenated RBC's Mod/2+ None Seen    Toxic Granulation Slight/1+ None Seen     Vacuolated Neutrophils Slight/1+ None Seen    Platelet Morphology Normal Normal     Assessment and Plan    Left pyelonephritis  Left ureteral stone status post ureteral stent    Discussed with hospitalist team.  Okay for discharge they will contact her regarding her culture results.  I will have my office contact her regarding definitive management of her stone with ureteroscopy in 2 weeks.

## 2019-06-12 NOTE — PLAN OF CARE
Problem: Patient Care Overview  Goal: Plan of Care Review  Outcome: Ongoing (interventions implemented as appropriate)   06/12/19 8884   Coping/Psychosocial   Plan of Care Reviewed With patient   Plan of Care Review   Progress improving   OTHER   Outcome Summary Pt c/o pain x1 on left side-good relief with prn pain med. Pt did c/o having nightmares that she believes is related to the pain meds she has received over the past two days. VSS. NSR 76-99. IVF infusing. IV ABX administered. Plans for discharge this AM. Will continue to monitor.      Goal: Individualization and Mutuality  Outcome: Ongoing (interventions implemented as appropriate)    Goal: Discharge Needs Assessment  Outcome: Ongoing (interventions implemented as appropriate)      Problem: Pain, Acute (Adult)  Goal: Acceptable Pain Control/Comfort Level  Outcome: Ongoing (interventions implemented as appropriate)

## 2019-06-12 NOTE — PAYOR COMM NOTE
"Radha Conenr (18 y.o. Female)     Date of Birth Social Security Number Address Home Phone MRN    [id number]  [id number] [place] [address] [phone] [id number]    [employer] Marital Status          Other Single       Admission Date Admission Type Admitting Provider Attending Provider Department, Room/Bed    6/11/19 Urgent Yousif Hirsch MD  [facility] 4B, 409/1    Discharge Date Discharge Disposition Discharge Destination        6/12/2019 Home or Self Care              Attending Provider:  (none)   Allergies:  No Known Allergies    Isolation:  None   Infection:  None   Code Status:  Prior    Ht:  165.1 cm (65\")   Wt:  89.8 kg (198 lb)    Admission Cmt:  None   Principal Problem:  Acute pyelonephritis [N10]                 Active Insurance as of 6/11/2019     Primary Coverage     Payor Plan Insurance Group Employer/Plan Group    [employer] [id number]     Payor Plan Address Payor Plan Phone Number Payor Plan Fax Number Effective Dates    [address] [phone]  10/1/2018 - None Entered    [place] [address]       Subscriber Name Subscriber Birth Date Member ID       SACHIN CONNER 12/7/1964 [id number]                 Emergency Contacts      (Rel.) Home Phone Work Phone Mobile Phone    KHUSHBU CONNER (Mother) [phone] -- --    Thomas Orteganah (Friend) -- -- [phone]    Radha Conner -- -- [phone]                       Ayo Randle MD   Physician   Urology   Progress Notes   Signed   Date of Service: 6/12/2019 6:23 AM   Creation Time: 6/12/2019 6:23 AM             Signed      Expand All Collapse All       Show:Clear all   ManualTemplateCopied  Added by:   Ayo Randle MD  [facility] for details                                                                                               Subjective   Ms. Conner is 18 y.o. female   Chief Complaint: Pyelonephritis/Left Ureteral Stone   History of Present " Illness   Pain much improved, + Frequency and + Urgency   The following portions of the patient's history were reviewed and updated as appropriate: allergies, current medications, past family history, past medical history, past social history, past surgical history and problem list.   Review of Systems   Constitutional: Negative for chills and fever.   Gastrointestinal: Negative for abdominal pain, anal bleeding and blood in stool.   Genitourinary: Positive for frequency and urgency. Negative for dysuria and hematuria.       Current Facility-Administered Medications:   • acetaminophen (TYLENOL) tablet 650 mg, 650 mg, Oral, Q4H PRN, Td Mcclendon MD   • cefTRIAXone (ROCEPHIN) 2 g/100 mL 0.9% NS VTB (BRITTNEY), 2 g, Intravenous, Q24H, Td Mcclendon MD, Stopped at 06/11/19 2037   • HYDROcodone-acetaminophen (NORCO) 7.5-325 MG per tablet 1 tablet, 1 tablet, Oral, Q4H PRN, Td Mcclendon MD, 1 tablet at 06/11/19 0744   • HYDROmorphone (DILAUDID) injection 0.5 mg, 0.5 mg, Intravenous, Q2H PRN, 0.5 mg at 06/11/19 2204 **AND** naloxone (NARCAN) injection 0.4 mg, 0.4 mg, Intravenous, Q5 Min PRN, Td Mcclendon MD   • ondansetron (ZOFRAN) tablet 4 mg, 4 mg, Oral, Q6H PRN **OR** ondansetron (ZOFRAN) injection 4 mg, 4 mg, Intravenous, Q6H PRN, Td Mcclendon MD   • oxybutynin XL (DITROPAN-XL) 24 hr tablet 5 mg, 5 mg, Oral, Daily, Ayo Randle MD, 5 mg at 06/11/19 1441   • sodium chloride 0.9 % flush 3 mL, 3 mL, Intravenous, Q12H, Td Mcclendon MD, 3 mL at 06/11/19 2008   • sodium chloride 0.9 % flush 3-10 mL, 3-10 mL, Intravenous, PRN, Td Mcclendon MD   • sodium chloride 0.9 % infusion, 100 mL/hr, Intravenous, Continuous, Ayo Randle MD, Last Rate: 100 mL/hr at 06/12/19 0509, 100 mL/hr at 06/12/19 0509   • tamsulosin (FLOMAX) 24 hr capsule 0.4 mg, 0.4 mg, Oral, Daily, Ayo Randle MD, 0.4 mg at 06/11/19 1441     Medical History                                                            Discharge Order (From admission, onward)    Start     Ordered    06/12/19 0622  Discharge patient  Once     Expected Discharge Date:  06/12/19    Discharge Disposition:  Home or Self Care    Physician of Record for Attribution - Please select from Treatment Team:  AMANDO PAYNE [[id number]]    Review needed by CMO to determine Physician of Record:  No       Question Answer Comment   Physician of Record for Attribution - Please select from Treatment Team AMANDO PAYNE    Review needed by CMO to determine Physician of Record No        06/12/19 0624

## 2019-06-13 ENCOUNTER — TELEPHONE (OUTPATIENT)
Dept: UROLOGY | Facility: CLINIC | Age: 19
End: 2019-06-13

## 2019-06-13 LAB — BACTERIA SPEC AEROBE CULT: NORMAL

## 2019-06-13 NOTE — TELEPHONE ENCOUNTER
Called [facility] with dates and times for preop and procedure.  ----- Message from Ayo Randle MD sent at 6/12/2019  6:28 AM CDT -----  Needs to be scheduled for ureteroscopy with laser lithotripsy and ureteral stent June 27

## 2019-06-16 LAB
BACTERIA SPEC AEROBE CULT: NORMAL
BACTERIA SPEC AEROBE CULT: NORMAL

## 2019-06-20 ENCOUNTER — APPOINTMENT (OUTPATIENT)
Dept: PREADMISSION TESTING | Facility: HOSPITAL | Age: 19
End: 2019-06-20

## 2019-06-20 VITALS
SYSTOLIC BLOOD PRESSURE: 142 MMHG | HEIGHT: 67 IN | DIASTOLIC BLOOD PRESSURE: 89 MMHG | BODY MASS INDEX: 30 KG/M2 | HEART RATE: 88 BPM | OXYGEN SATURATION: 100 % | WEIGHT: 191.14 LBS

## 2019-06-20 LAB
DEPRECATED RDW RBC AUTO: 44.6 FL (ref 40–54)
ERYTHROCYTE [DISTWIDTH] IN BLOOD BY AUTOMATED COUNT: 13.2 % (ref 12–15)
HCT VFR BLD AUTO: 39.7 % (ref 37–47)
HGB BLD-MCNC: 12.6 G/DL (ref 12–16)
MCH RBC QN AUTO: 29.4 PG (ref 28–32)
MCHC RBC AUTO-ENTMCNC: 31.7 G/DL (ref 33–36)
MCV RBC AUTO: 92.8 FL (ref 82–98)
PLATELET # BLD AUTO: 665 10*3/MM3 (ref 130–400)
PMV BLD AUTO: 8.6 FL (ref 6–12)
RBC # BLD AUTO: 4.28 10*6/MM3 (ref 4.2–5.4)
WBC NRBC COR # BLD: 13.41 10*3/MM3 (ref 4.8–10.8)

## 2019-06-20 PROCEDURE — 36415 COLL VENOUS BLD VENIPUNCTURE: CPT

## 2019-06-20 PROCEDURE — 85027 COMPLETE CBC AUTOMATED: CPT | Performed by: UROLOGY

## 2019-06-20 NOTE — DISCHARGE INSTRUCTIONS
DAY OF SURGERY INSTRUCTIONS        YOUR SURGEON: ***    PROCEDURE: ***left ureteroscopy laser lithotripsy with stent insertion    DATE OF SURGERY: ***6/27/19    ARRIVAL TIME: AS DIRECTED BY OFFICE    YOU MAY TAKE THE FOLLOWING MEDICATION(S) THE MORNING OF SURGERY WITH A SIP OF WATER: ***as directed by your surgeon    ALL OTHER HOME MEDICATIONS CHECK WITH YOUR DOCTOR              MANAGING PAIN AFTER SURGERY    We know you are probably wondering what your pain will be like after surgery.  Following surgery it is unrealistic to expect you will not have pain.   Pain is how our bodies let us know that something is wrong or cautions us to be careful.  That said, our goal is to make your pain tolerable.    Methods we may use to treat your pain include (oral or IV medications, PCAs, epidurals, nerve blocks, etc.)   While some procedures require IV pain medications for a short time after surgery, transitioning to pain medications by mouth allows for better management of pain.   Your nurse will encourage you to take oral pain medications whenever possible.  IV medications work almost immediately, but only last a short while.  Taking medications by mouth allows for a more constant level of medication in your blood stream for a longer period of time.      Once your pain is out of control it is harder to get back under control.  It is important you are aware when your next dose of pain medication is due.  If you are admitted, your nurse may write the time of your next dose on the white board in your room to help you remember.      We are interested in your pain and encourage you to inform us about aggravating factors during your visit.   Many times a simple repositioning every few hours can make a big difference.    If your physician says it is okay, do not let your pain prevent you from getting out of bed. Be sure to call your nurse for assistance prior to getting up so you do not fall.      Before surgery, please decide  your tolerable pain goal.  These faces help describe the pain ratings we use on a 0-10 scale.   Be prepared to tell us your goal and whether or not you take pain or anxiety medications at home.            BEFORE YOU COME TO THE HOSPITAL  (Pre-op instructions)  • Do not eat, drink, smoke or chew gum after midnight the night before surgery.  This also includes no mints.  • Morning of surgery take only the medicines you have been instructed with a sip of water unless otherwise instructed  by your physician.  • Do not shave, wear makeup or dark nail polish.  • Remove all jewelry including rings.  • Leave anything you consider valuable at home.  • Leave your suitcase in the car until after your surgery.  • Bring the following with you if applicable:  o Picture ID and insurance, Medicare or Medicaid cards  o Co-pay/deductible required by insurance (cash, check, credit card)  o Copy of advance directive, living will or power-of- documents if not brought to PAT  o CPAP or BIPAP mask and tubing  o Relaxation aids (MP3 player, book, magazine)  • On the day of surgery check in at registration located at the main entrance of the hospital.       Outpatient Surgery Guidelines, Adult  Outpatient procedures are those for which the person having the procedure is allowed to go home the same day as the procedure. Various procedures are done on an outpatient basis. You should follow some general guidelines if you will be having an outpatient procedure.  LET YOUR HEALTH CARE PROVIDER KNOW ABOUT:  · Any allergies you have.  · All medicines you are taking, including vitamins, herbs, eye drops, creams, and over-the-counter medicines.  · Previous problems you or members of your family have had with the use of anesthetics.  · Any blood disorders you have.  · Previous surgeries you have had.  · Medical conditions you have.  RISKS AND COMPLICATIONS  Your health care provider will discuss possible risks and complications with you before  surgery. Common risks and complications include:    · Problems due to the use of anesthetics.  · Blood loss and replacement (does not apply to minor surgical procedures).  · Temporary increase in pain due to surgery.  · Uncorrected pain or problems that the surgery was meant to correct.  · Infection.  · New damage.  BEFORE THE PROCEDURE  · Ask your health care provider about changing or stopping your regular medicines. You may need to stop taking certain medicines in the days or weeks before the procedure.  · Stop smoking at least 2 weeks before surgery. This lowers your risk for complications during and after surgery. Ask your health care provider for help with this if needed.  · Eat your usual meals and a light supper the day before surgery. Continue fluid intake. Do not drink alcohol.  · Do not eat or drink after midnight the night before your surgery.   · Arrange for someone to take you home and to stay with you for 24 hours after the procedure. Medicine given for your procedure may affect your ability to drive or to care for yourself.  · Call your health care provider's office if you develop an illness or problem that may prevent you from safely having your procedure.  AFTER THE PROCEDURE  After surgery, you will be taken to a recovery area, where your progress will be monitored. If there are no complications, you will be allowed to go home when you are awake, stable, and taking fluids well. You may have numbness around the surgical site. Healing will take some time. You will have tenderness at the surgical site and may have some swelling and bruising. You may also have some nausea.  HOME CARE INSTRUCTIONS  · Do not drive for 24 hours, or as directed by your health care provider. Do not drive while taking prescription pain medicines.  · Do not drink alcohol for 24 hours.  · Do not make important decisions or sign legal documents for 24 hours.  · You may resume a normal diet and activities as directed.  · Do not  lift anything heavier than 10 pounds (4.5 kg) or play contact sports until your health care provider says it is okay.  · Change your bandages (dressings) as directed.  · Only take over-the-counter or prescription medicines as directed by your health care provider.  · Follow up with your health care provider as directed.  SEEK MEDICAL CARE IF:  · You have increased bleeding (more than a small spot) from the surgical site.  · You have redness, swelling, or increasing pain in the wound.  · You see pus coming from the wound.  · You have a fever.  · You notice a bad smell coming from the wound or dressing.  · You feel lightheaded or faint.  · You develop a rash.  · You have trouble breathing.  · You develop allergies.  MAKE SURE YOU:  · Understand these instructions.  · Will watch your condition.  · Will get help right away if you are not doing well or get worse.     This information is not intended to replace advice given to you by your health care provider. Make sure you discuss any questions you have with your health care provider.     Document Released: 09/12/2002 Document Revised: 05/03/2016 Document Reviewed: 05/22/2014  [employer] Interactive Patient Education ©2016 [employer]       Fall Prevention in Hospitals, Adult  As a hospital patient, your condition and the treatments you receive can increase your risk for falls. Some additional risk factors for falls in a hospital include:  · Being in an unfamiliar environment.  · Being on bed rest.  · Your surgery.  · Taking certain medicines.  · Your tubing requirements, such as intravenous (IV) therapy or catheters.  It is important that you learn how to decrease fall risks while at the hospital. Below are important tips that can help prevent falls.  SAFETY TIPS FOR PREVENTING FALLS  Talk about your risk of falling.  · Ask your health care provider why you are at risk for falling. Is it your medicine, illness, tubing placement, or something else?  · Make a plan with  your health care provider to keep you safe from falls.  · Ask your health care provider or pharmacist about side effects of your medicines. Some medicines can make you dizzy or affect your coordination.  Ask for help.  · Ask for help before getting out of bed. You may need to press your call button.  · Ask for assistance in getting safely to the toilet.  · Ask for a walker or cane to be put at your bedside. Ask that most of the side rails on your bed be placed up before your health care provider leaves the room.  · Ask family or friends to sit with you.  · Ask for things that are out of your reach, such as your glasses, hearing aids, telephone, bedside table, or call button.  Follow these tips to avoid falling:  · Stay lying or seated, rather than standing, while waiting for help.  · Wear rubber-soled slippers or shoes whenever you walk in the hospital.  · Avoid quick, sudden movements.  ¨ Change positions slowly.  ¨ Sit on the side of your bed before standing.  ¨ Stand up slowly and wait before you start to walk.  · Let your health care provider know if there is a spill on the floor.  · Pay careful attention to the medical equipment, electrical cords, and tubes around you.  · When you need help, use your call button by your bed or in the bathroom. Wait for one of your health care providers to help you.  · If you feel dizzy or unsure of your footing, return to bed and wait for assistance.  · Avoid being distracted by the TV, telephone, or another person in your room.  · Do not lean or support yourself on rolling objects, such as IV poles or bedside tables.     This information is not intended to replace advice given to you by your health care provider. Make sure you discuss any questions you have with your health care provider.     Document Released: 12/15/2001 Document Revised: 01/08/2016 Document Reviewed: 08/25/2013  [employer] Interactive Patient Education ©2016 [employer]       Surgical Site Infections  FAQs  What is a Surgical Site Infection (SSI)?  A surgical site infection is an infection that occurs after surgery in the part of the body where the surgery took place. Most patients who have surgery do not develop an infection. However, infections develop in about 1 to 3 out of every 100 patients who have surgery.  Some of the common symptoms of a surgical site infection are:  · Redness and pain around the area where you had surgery  · Drainage of cloudy fluid from your surgical wound  · Fever  Can SSIs be treated?  Yes. Most surgical site infections can be treated with antibiotics. The antibiotic given to you depends on the bacteria (germs) causing the infection. Sometimes patients with SSIs also need another surgery to treat the infection.  What are some of the things that hospitals are doing to prevent SSIs?  To prevent SSIs, doctors, nurses, and other healthcare providers:  · Clean their hands and arms up to their elbows with an antiseptic agent just before the surgery.  · Clean their hands with soap and water or an alcohol-based hand rub before and after caring for each patient.  · May remove some of your hair immediately before your surgery using electric clippers if the hair is in the same area where the procedure will occur. They should not shave you with a razor.  · Wear special hair covers, masks, gowns, and gloves during surgery to keep the surgery area clean.  · Give you antibiotics before your surgery starts. In most cases, you should get antibiotics within 60 minutes before the surgery starts and the antibiotics should be stopped within 24 hours after surgery.  · Clean the skin at the site of your surgery with a special soap that kills germs.  What can I do to help prevent SSIs?  Before your surgery:  · Tell your doctor about other medical problems you may have. Health problems such as allergies, diabetes, and obesity could affect your surgery and your treatment.  · Quit smoking. Patients who smoke  get more infections. Talk to your doctor about how you can quit before your surgery.  · Do not shave near where you will have surgery. Shaving with a razor can irritate your skin and make it easier to develop an infection.  At the time of your surgery:  · Speak up if someone tries to shave you with a razor before surgery. Ask why you need to be shaved and talk with your surgeon if you have any concerns.  · Ask if you will get antibiotics before surgery.  After your surgery:  · Make sure that your healthcare providers clean their hands before examining you, either with soap and water or an alcohol-based hand rub.  · If you do not see your providers clean their hands, please ask them to do so.  · Family and friends who visit you should not touch the surgical wound or dressings.  · Family and friends should clean their hands with soap and water or an alcohol-based hand rub before and after visiting you. If you do not see them clean their hands, ask them to clean their hands.  What do I need to do when I go home from the hospital?  · Before you go home, your doctor or nurse should explain everything you need to know about taking care of your wound. Make sure you understand how to care for your wound before you leave the hospital.  · Always clean your hands before and after caring for your wound.  · Before you go home, make sure you know who to contact if you have questions or problems after you get home.  · If you have any symptoms of an infection, such as redness and pain at the surgery site, drainage, or fever, call your doctor immediately.  If you have additional questions, please ask your doctor or nurse.  Developed and co-sponsored by The Society for Healthcare Epidemiology of [place] (SHEA); Infectious Diseases Society of [place] (IDSA); American Hospital Association; Association for Professionals in Infection Control and Epidemiology (APIC); Centers for Disease Control and Prevention (CDC); and The Joint  Commission.     This information is not intended to replace advice given to you by your health care provider. Make sure you discuss any questions you have with your health care provider.     Document Released: 12/23/2014 Document Revised: 01/08/2016 Document Reviewed: 03/02/2016  [employer] Interactive Patient Education ©2016 [employer]     PATIENT/FAMILY/RESPONSIBLE PARTY VERBALIZES UNDERSTANDING OF ABOVE EDUCATION.  COPY OF PAIN SCALE GIVEN AND REVIEWED WITH VERBALIZED UNDERSTANDING.

## 2019-06-27 ENCOUNTER — ANESTHESIA EVENT (OUTPATIENT)
Dept: PERIOP | Facility: HOSPITAL | Age: 19
End: 2019-06-27

## 2019-06-27 ENCOUNTER — APPOINTMENT (OUTPATIENT)
Dept: GENERAL RADIOLOGY | Facility: HOSPITAL | Age: 19
End: 2019-06-27

## 2019-06-27 ENCOUNTER — ANESTHESIA (OUTPATIENT)
Dept: PERIOP | Facility: HOSPITAL | Age: 19
End: 2019-06-27

## 2019-06-27 ENCOUNTER — HOSPITAL ENCOUNTER (OUTPATIENT)
Facility: HOSPITAL | Age: 19
Setting detail: HOSPITAL OUTPATIENT SURGERY
Discharge: HOME | End: 2019-06-27
Attending: UROLOGY | Admitting: UROLOGY

## 2019-06-27 VITALS
HEART RATE: 77 BPM | RESPIRATION RATE: 18 BRPM | DIASTOLIC BLOOD PRESSURE: 72 MMHG | OXYGEN SATURATION: 100 % | SYSTOLIC BLOOD PRESSURE: 122 MMHG | TEMPERATURE: 97.6 F

## 2019-06-27 DIAGNOSIS — N13.2 URETERAL STONE WITH HYDRONEPHROSIS: ICD-10-CM

## 2019-06-27 LAB — B-HCG UR QL: NEGATIVE

## 2019-06-27 PROCEDURE — C1769 GUIDE WIRE: HCPCS | Performed by: UROLOGY

## 2019-06-27 PROCEDURE — 25010000002 DEXAMETHASONE PER 1 MG: Performed by: ANESTHESIOLOGY

## 2019-06-27 PROCEDURE — 25010000002 MIDAZOLAM PER 1 MG: Performed by: ANESTHESIOLOGY

## 2019-06-27 PROCEDURE — 52356 CYSTO/URETERO W/LITHOTRIPSY: CPT | Performed by: UROLOGY

## 2019-06-27 PROCEDURE — C2617 STENT, NON-COR, TEM W/O DEL: HCPCS | Performed by: UROLOGY

## 2019-06-27 PROCEDURE — 25010000002 PROPOFOL 10 MG/ML EMULSION: Performed by: NURSE ANESTHETIST, CERTIFIED REGISTERED

## 2019-06-27 PROCEDURE — 25010000002 ONDANSETRON PER 1 MG: Performed by: NURSE ANESTHETIST, CERTIFIED REGISTERED

## 2019-06-27 PROCEDURE — 25010000002 KETOROLAC TROMETHAMINE PER 15 MG: Performed by: NURSE ANESTHETIST, CERTIFIED REGISTERED

## 2019-06-27 PROCEDURE — 74420 UROGRAPHY RTRGR +-KUB: CPT | Performed by: UROLOGY

## 2019-06-27 PROCEDURE — 74420 UROGRAPHY RTRGR +-KUB: CPT

## 2019-06-27 PROCEDURE — 25010000002 FENTANYL CITRATE (PF) 100 MCG/2ML SOLUTION: Performed by: ANESTHESIOLOGY

## 2019-06-27 PROCEDURE — 25010000002 FENTANYL CITRATE (PF) 100 MCG/2ML SOLUTION: Performed by: NURSE ANESTHETIST, CERTIFIED REGISTERED

## 2019-06-27 PROCEDURE — C1894 INTRO/SHEATH, NON-LASER: HCPCS | Performed by: UROLOGY

## 2019-06-27 PROCEDURE — C1758 CATHETER, URETERAL: HCPCS | Performed by: UROLOGY

## 2019-06-27 PROCEDURE — 25010000002 DEXAMETHASONE PER 1 MG: Performed by: NURSE ANESTHETIST, CERTIFIED REGISTERED

## 2019-06-27 PROCEDURE — 81025 URINE PREGNANCY TEST: CPT | Performed by: UROLOGY

## 2019-06-27 DEVICE — URETERAL STENT
Type: IMPLANTABLE DEVICE | Site: URETER | Status: FUNCTIONAL
Brand: PERCUFLEX™ PLUS

## 2019-06-27 RX ORDER — SODIUM CHLORIDE 0.9 % (FLUSH) 0.9 %
3 SYRINGE (ML) INJECTION EVERY 12 HOURS SCHEDULED
Status: DISCONTINUED | OUTPATIENT
Start: 2019-06-27 | End: 2019-06-27 | Stop reason: HOSPADM

## 2019-06-27 RX ORDER — NALOXONE HCL 0.4 MG/ML
0.4 VIAL (ML) INJECTION AS NEEDED
Status: DISCONTINUED | OUTPATIENT
Start: 2019-06-27 | End: 2019-06-27 | Stop reason: HOSPADM

## 2019-06-27 RX ORDER — ONDANSETRON 2 MG/ML
4 INJECTION INTRAMUSCULAR; INTRAVENOUS ONCE AS NEEDED
Status: DISCONTINUED | OUTPATIENT
Start: 2019-06-27 | End: 2019-06-27 | Stop reason: HOSPADM

## 2019-06-27 RX ORDER — BUPIVACAINE HCL/0.9 % NACL/PF 0.1 %
2 PLASTIC BAG, INJECTION (ML) EPIDURAL ONCE
Status: COMPLETED | OUTPATIENT
Start: 2019-06-27 | End: 2019-06-27

## 2019-06-27 RX ORDER — HYDROCODONE BITARTRATE AND ACETAMINOPHEN 7.5; 325 MG/1; MG/1
1 TABLET ORAL EVERY 6 HOURS PRN
Qty: 12 TABLET | Refills: 0 | Status: SHIPPED | OUTPATIENT
Start: 2019-06-27 | End: 2019-11-11

## 2019-06-27 RX ORDER — DEXAMETHASONE SODIUM PHOSPHATE 4 MG/ML
INJECTION, SOLUTION INTRA-ARTICULAR; INTRALESIONAL; INTRAMUSCULAR; INTRAVENOUS; SOFT TISSUE AS NEEDED
Status: DISCONTINUED | OUTPATIENT
Start: 2019-06-27 | End: 2019-06-27 | Stop reason: SURG

## 2019-06-27 RX ORDER — PROPOFOL 10 MG/ML
VIAL (ML) INTRAVENOUS AS NEEDED
Status: DISCONTINUED | OUTPATIENT
Start: 2019-06-27 | End: 2019-06-27 | Stop reason: SURG

## 2019-06-27 RX ORDER — ACETAMINOPHEN 500 MG
1000 TABLET ORAL ONCE
Status: COMPLETED | OUTPATIENT
Start: 2019-06-27 | End: 2019-06-27

## 2019-06-27 RX ORDER — ONDANSETRON 4 MG/1
4 TABLET, FILM COATED ORAL ONCE AS NEEDED
Status: DISCONTINUED | OUTPATIENT
Start: 2019-06-27 | End: 2019-06-27 | Stop reason: HOSPADM

## 2019-06-27 RX ORDER — LABETALOL HYDROCHLORIDE 5 MG/ML
5 INJECTION, SOLUTION INTRAVENOUS
Status: DISCONTINUED | OUTPATIENT
Start: 2019-06-27 | End: 2019-06-27 | Stop reason: HOSPADM

## 2019-06-27 RX ORDER — SODIUM CHLORIDE, SODIUM LACTATE, POTASSIUM CHLORIDE, CALCIUM CHLORIDE 600; 310; 30; 20 MG/100ML; MG/100ML; MG/100ML; MG/100ML
1000 INJECTION, SOLUTION INTRAVENOUS CONTINUOUS
Status: DISCONTINUED | OUTPATIENT
Start: 2019-06-27 | End: 2019-06-27 | Stop reason: HOSPADM

## 2019-06-27 RX ORDER — METOCLOPRAMIDE HYDROCHLORIDE 5 MG/ML
5 INJECTION INTRAMUSCULAR; INTRAVENOUS
Status: DISCONTINUED | OUTPATIENT
Start: 2019-06-27 | End: 2019-06-27 | Stop reason: HOSPADM

## 2019-06-27 RX ORDER — CIPROFLOXACIN 500 MG/1
500 TABLET, FILM COATED ORAL 2 TIMES DAILY
COMMUNITY
End: 2019-11-11

## 2019-06-27 RX ORDER — FENTANYL CITRATE 50 UG/ML
INJECTION, SOLUTION INTRAMUSCULAR; INTRAVENOUS AS NEEDED
Status: DISCONTINUED | OUTPATIENT
Start: 2019-06-27 | End: 2019-06-27 | Stop reason: SURG

## 2019-06-27 RX ORDER — ONDANSETRON 2 MG/ML
INJECTION INTRAMUSCULAR; INTRAVENOUS AS NEEDED
Status: DISCONTINUED | OUTPATIENT
Start: 2019-06-27 | End: 2019-06-27 | Stop reason: SURG

## 2019-06-27 RX ORDER — OXYCODONE AND ACETAMINOPHEN 10; 325 MG/1; MG/1
1 TABLET ORAL ONCE AS NEEDED
Status: DISCONTINUED | OUTPATIENT
Start: 2019-06-27 | End: 2019-06-27 | Stop reason: HOSPADM

## 2019-06-27 RX ORDER — KETOROLAC TROMETHAMINE 30 MG/ML
INJECTION, SOLUTION INTRAMUSCULAR; INTRAVENOUS AS NEEDED
Status: DISCONTINUED | OUTPATIENT
Start: 2019-06-27 | End: 2019-06-27 | Stop reason: SURG

## 2019-06-27 RX ORDER — OXYCODONE AND ACETAMINOPHEN 7.5; 325 MG/1; MG/1
2 TABLET ORAL EVERY 4 HOURS PRN
Status: DISCONTINUED | OUTPATIENT
Start: 2019-06-27 | End: 2019-06-27 | Stop reason: HOSPADM

## 2019-06-27 RX ORDER — MAGNESIUM HYDROXIDE 1200 MG/15ML
LIQUID ORAL AS NEEDED
Status: DISCONTINUED | OUTPATIENT
Start: 2019-06-27 | End: 2019-06-27 | Stop reason: HOSPADM

## 2019-06-27 RX ORDER — DEXAMETHASONE SODIUM PHOSPHATE 4 MG/ML
4 INJECTION, SOLUTION INTRA-ARTICULAR; INTRALESIONAL; INTRAMUSCULAR; INTRAVENOUS; SOFT TISSUE ONCE AS NEEDED
Status: COMPLETED | OUTPATIENT
Start: 2019-06-27 | End: 2019-06-27

## 2019-06-27 RX ORDER — HYDROCODONE BITARTRATE AND ACETAMINOPHEN 7.5; 325 MG/1; MG/1
1 TABLET ORAL ONCE AS NEEDED
Status: CANCELLED | OUTPATIENT
Start: 2019-06-27 | End: 2019-07-07

## 2019-06-27 RX ORDER — SODIUM CHLORIDE 9 MG/ML
100 INJECTION, SOLUTION INTRAVENOUS CONTINUOUS
Status: DISCONTINUED | OUTPATIENT
Start: 2019-06-27 | End: 2019-06-27 | Stop reason: HOSPADM

## 2019-06-27 RX ORDER — SODIUM CHLORIDE 0.9 % (FLUSH) 0.9 %
1-10 SYRINGE (ML) INJECTION AS NEEDED
Status: DISCONTINUED | OUTPATIENT
Start: 2019-06-27 | End: 2019-06-27 | Stop reason: HOSPADM

## 2019-06-27 RX ORDER — LIDOCAINE HYDROCHLORIDE 20 MG/ML
INJECTION, SOLUTION INFILTRATION; PERINEURAL AS NEEDED
Status: DISCONTINUED | OUTPATIENT
Start: 2019-06-27 | End: 2019-06-27 | Stop reason: SURG

## 2019-06-27 RX ORDER — IPRATROPIUM BROMIDE AND ALBUTEROL SULFATE 2.5; .5 MG/3ML; MG/3ML
3 SOLUTION RESPIRATORY (INHALATION) ONCE AS NEEDED
Status: DISCONTINUED | OUTPATIENT
Start: 2019-06-27 | End: 2019-06-27 | Stop reason: HOSPADM

## 2019-06-27 RX ORDER — SODIUM CHLORIDE, SODIUM LACTATE, POTASSIUM CHLORIDE, CALCIUM CHLORIDE 600; 310; 30; 20 MG/100ML; MG/100ML; MG/100ML; MG/100ML
100 INJECTION, SOLUTION INTRAVENOUS CONTINUOUS
Status: DISCONTINUED | OUTPATIENT
Start: 2019-06-27 | End: 2019-06-27 | Stop reason: HOSPADM

## 2019-06-27 RX ORDER — KETOROLAC TROMETHAMINE 10 MG/1
10 TABLET, FILM COATED ORAL EVERY 6 HOURS PRN
Qty: 12 TABLET | Refills: 0 | Status: SHIPPED | OUTPATIENT
Start: 2019-06-27 | End: 2019-11-11

## 2019-06-27 RX ORDER — MIDAZOLAM HYDROCHLORIDE 1 MG/ML
1 INJECTION INTRAMUSCULAR; INTRAVENOUS
Status: DISCONTINUED | OUTPATIENT
Start: 2019-06-27 | End: 2019-06-27 | Stop reason: HOSPADM

## 2019-06-27 RX ORDER — IBUPROFEN 600 MG/1
600 TABLET ORAL ONCE AS NEEDED
Status: DISCONTINUED | OUTPATIENT
Start: 2019-06-27 | End: 2019-06-27 | Stop reason: HOSPADM

## 2019-06-27 RX ORDER — FENTANYL CITRATE 50 UG/ML
25 INJECTION, SOLUTION INTRAMUSCULAR; INTRAVENOUS AS NEEDED
Status: COMPLETED | OUTPATIENT
Start: 2019-06-27 | End: 2019-06-27

## 2019-06-27 RX ORDER — MIDAZOLAM HYDROCHLORIDE 1 MG/ML
2 INJECTION INTRAMUSCULAR; INTRAVENOUS
Status: DISCONTINUED | OUTPATIENT
Start: 2019-06-27 | End: 2019-06-27 | Stop reason: HOSPADM

## 2019-06-27 RX ADMIN — ONDANSETRON HYDROCHLORIDE 4 MG: 2 SOLUTION INTRAMUSCULAR; INTRAVENOUS at 12:09

## 2019-06-27 RX ADMIN — MIDAZOLAM 2 MG: 1 INJECTION INTRAMUSCULAR; INTRAVENOUS at 11:53

## 2019-06-27 RX ADMIN — FENTANYL CITRATE 25 MCG: 50 INJECTION INTRAMUSCULAR; INTRAVENOUS at 12:51

## 2019-06-27 RX ADMIN — SODIUM CHLORIDE, POTASSIUM CHLORIDE, SODIUM LACTATE AND CALCIUM CHLORIDE 1000 ML: 600; 310; 30; 20 INJECTION, SOLUTION INTRAVENOUS at 11:25

## 2019-06-27 RX ADMIN — FENTANYL CITRATE 25 MCG: 50 INJECTION INTRAMUSCULAR; INTRAVENOUS at 12:49

## 2019-06-27 RX ADMIN — DEXAMETHASONE SODIUM PHOSPHATE 4 MG: 4 INJECTION, SOLUTION INTRAMUSCULAR; INTRAVENOUS at 12:09

## 2019-06-27 RX ADMIN — LIDOCAINE HYDROCHLORIDE 100 MG: 20 INJECTION, SOLUTION INFILTRATION; PERINEURAL at 12:04

## 2019-06-27 RX ADMIN — ACETAMINOPHEN 1000 MG: 500 TABLET, FILM COATED ORAL at 11:53

## 2019-06-27 RX ADMIN — FENTANYL CITRATE 100 MCG: 50 INJECTION, SOLUTION INTRAMUSCULAR; INTRAVENOUS at 12:04

## 2019-06-27 RX ADMIN — KETOROLAC TROMETHAMINE 30 MG: 30 INJECTION, SOLUTION INTRAMUSCULAR at 12:12

## 2019-06-27 RX ADMIN — DEXAMETHASONE SODIUM PHOSPHATE 4 MG: 4 INJECTION, SOLUTION INTRAMUSCULAR; INTRAVENOUS at 11:53

## 2019-06-27 RX ADMIN — OXYCODONE HYDROCHLORIDE AND ACETAMINOPHEN 2 TABLET: 7.5; 325 TABLET ORAL at 12:51

## 2019-06-27 RX ADMIN — Medication 2 G: at 12:08

## 2019-06-27 RX ADMIN — FENTANYL CITRATE 25 MCG: 50 INJECTION INTRAMUSCULAR; INTRAVENOUS at 12:47

## 2019-06-27 RX ADMIN — KETOROLAC TROMETHAMINE 30 MG: 30 INJECTION, SOLUTION INTRAMUSCULAR at 12:11

## 2019-06-27 RX ADMIN — PROPOFOL 200 MG: 10 INJECTION, EMULSION INTRAVENOUS at 12:04

## 2019-06-27 RX ADMIN — FENTANYL CITRATE 25 MCG: 50 INJECTION INTRAMUSCULAR; INTRAVENOUS at 12:53

## 2019-06-27 NOTE — ANESTHESIA PREPROCEDURE EVALUATION
Anesthesia Evaluation     Patient summary reviewed   no history of anesthetic complications:  NPO Solid Status: > 8 hours  NPO Liquid Status: > 8 hours           Airway   Mallampati: I  TM distance: >3 FB  Neck ROM: full  Dental - normal exam     Pulmonary - normal exam    breath sounds clear to auscultation  (+) a smoker (0.5 ppd) Current Abstained day of surgery,   (-) asthma, recent URI, sleep apnea  Cardiovascular - normal exam  Exercise tolerance: excellent (>7 METS)    ECG reviewed  Rhythm: regular  Rate: normal    (+) pericardial effusion (Noted on TTE),   (-) pacemaker, hypertension, past MI, angina, cardiac stents, CABG    ROS comment: TTE 6/11/19 - ·Left ventricular systolic function is normal. Estimated EF appears to be in the range of 61 - 65%.  ·Left ventricular diastolic function is normal.  ·No hemodynamically significant valvular abnormalities identified on the study.  ·A trivial pericardial effusion is noted with no hemodynamic consequence.    Neuro/Psych  (-) seizures, TIA, CVA  GI/Hepatic/Renal/Endo    (+)   renal disease stones,   (-) GERD, liver disease, diabetes, hypothyroidism, hyperthyroidism    Musculoskeletal     Abdominal    Substance History      OB/GYN          Other                        Anesthesia Plan    ASA 2     general     intravenous induction   Anesthetic plan, all risks, benefits, and alternatives have been provided, discussed and informed consent has been obtained with: patient.

## 2019-06-27 NOTE — ANESTHESIA PROCEDURE NOTES
Airway  Urgency: elective      General Information and Staff    Patient location during procedure: OR  CRNA: Amanda Erickson CRNA    Indications and Patient Condition  Indications for airway management: airway protection    Preoxygenated: yes  MILS maintained throughout  Mask difficulty assessment: 1 - vent by mask    Final Airway Details  Final airway type: supraglottic airway      Successful airway: unique  Size 4    Number of attempts at approach: 1

## 2019-06-27 NOTE — ANESTHESIA POSTPROCEDURE EVALUATION
Patient: Radha Diaz    Procedure Summary     Date:  06/27/19 Room / Location:   [facility] OR 01 / [facility] OR    Anesthesia Start:  1157 Anesthesia Stop:  1234    Procedure:  LEFT URETEROSCOPY LASER LITHOTRIPSY WITH STENT INSERTION (Left Ureter) Diagnosis:       Ureteral stone with hydronephrosis      (Ureteral stone with hydronephrosis [N13.2])    Surgeon:  Ayo Randle MD Provider:  Amanda Erickson CRNA    Anesthesia Type:  general ASA Status:  2          Anesthesia Type: general  Last vitals  BP   122/72 (06/27/19 1403)   Temp   97.6 °F (36.4 °C) (06/27/19 1315)   Pulse   77 (06/27/19 1403)   Resp   18 (06/27/19 1403)     SpO2   100 % (06/27/19 1403)     Post Anesthesia Care and Evaluation    Patient location during evaluation: PACU  Patient participation: complete - patient participated  Level of consciousness: awake and alert  Pain management: adequate  Airway patency: patent  Anesthetic complications: No anesthetic complications    Cardiovascular status: acceptable  Respiratory status: acceptable  Hydration status: acceptable    Comments: Blood pressure 122/72, pulse 77, temperature 97.6 °F (36.4 °C), temperature source Temporal, resp. rate 18, SpO2 100 %, not currently breastfeeding.    Pt discharged from PACU based on joni score >8

## 2019-07-01 ENCOUNTER — PROCEDURE VISIT (OUTPATIENT)
Dept: UROLOGY | Facility: CLINIC | Age: 19
End: 2019-07-01

## 2019-07-01 DIAGNOSIS — N20.1 LEFT URETERAL STONE: Primary | ICD-10-CM

## 2019-07-01 PROCEDURE — 52310 CYSTOSCOPY AND TREATMENT: CPT | Performed by: UROLOGY

## 2019-07-01 NOTE — PROGRESS NOTES
Cystoscopy with stent removal    Indications: Status post ureteroscopy    Prep: Hibiclens solution    Instrumentation:Rigid cystoscope with 22 [place] sheath and 30° lens and Alligator grasping forceps    Procedure: After lidocaine jelly is instilled into the urethra for 10 minutes, the well-lubricated cystoscope was introduced through the urethra into the bladder.  The stent is seen protruding from the left side.  The alligator forceps or used to grasp the stent and pull it out the urethra.  The patient tolerated the procedure well.

## 2019-11-07 ENCOUNTER — TELEPHONE (OUTPATIENT)
Dept: OBSTETRICS AND GYNECOLOGY | Facility: CLINIC | Age: 19
End: 2019-11-07

## 2019-11-07 NOTE — TELEPHONE ENCOUNTER
[facility] is aware of appt 11/11/2019 at 9:30. PT is also aware of location and to bring Id and insurance card to visit.
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
## 2019-11-11 ENCOUNTER — OFFICE VISIT (OUTPATIENT)
Dept: OBSTETRICS AND GYNECOLOGY | Facility: CLINIC | Age: 19
End: 2019-11-11

## 2019-11-11 VITALS
BODY MASS INDEX: 34.16 KG/M2 | SYSTOLIC BLOOD PRESSURE: 118 MMHG | WEIGHT: 205 LBS | DIASTOLIC BLOOD PRESSURE: 74 MMHG | HEIGHT: 65 IN

## 2019-11-11 DIAGNOSIS — Z11.3 SCREENING EXAMINATION FOR STD (SEXUALLY TRANSMITTED DISEASE): ICD-10-CM

## 2019-11-11 DIAGNOSIS — Z01.419 VISIT FOR GYNECOLOGIC EXAMINATION: Primary | ICD-10-CM

## 2019-11-11 PROCEDURE — 99385 PREV VISIT NEW AGE 18-39: CPT | Performed by: OBSTETRICS & GYNECOLOGY

## 2019-11-11 RX ORDER — IBUPROFEN 800 MG/1
TABLET ORAL
COMMUNITY
Start: 2019-10-27
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
## 2019-11-13 ENCOUNTER — TELEPHONE (OUTPATIENT)
Dept: OBSTETRICS AND GYNECOLOGY | Facility: CLINIC | Age: 19
End: 2019-11-13

## 2019-11-13 LAB
C TRACH RRNA SPEC QL NAA+PROBE: NEGATIVE
N GONORRHOEA RRNA SPEC QL NAA+PROBE: NEGATIVE
T VAGINALIS DNA SPEC QL NAA+PROBE: NEGATIVE

## 2019-11-27 ENCOUNTER — TELEPHONE (OUTPATIENT)
Dept: OBSTETRICS AND GYNECOLOGY | Facility: CLINIC | Age: 19
End: 2019-11-27

## 2019-12-03 DIAGNOSIS — Z11.3 SCREENING EXAMINATION FOR STD (SEXUALLY TRANSMITTED DISEASE): ICD-10-CM

## 2019-12-03 DIAGNOSIS — Z11.4 ENCOUNTER FOR SCREENING FOR HIV: Primary | ICD-10-CM

## (undated) DEVICE — GW SENSR DUALFLEX NITNL STR .038IN 3X150CM

## (undated) DEVICE — ENDOSCOPIC SEAL URO 1 SIZE FITS ALL: Brand: ENDOSCOPIC SEAL

## (undated) DEVICE — TBG DRN URINARY 9/32IN

## (undated) DEVICE — GLV SURG BIOGEL M LTX PF 7 1/2

## (undated) DEVICE — CATH URETH FLEXIMA CONE TP 5F 70CM

## (undated) DEVICE — CLTH CLENS READYCLEANSE PERI CARE PK/5

## (undated) DEVICE — PK CYSTO 30

## (undated) DEVICE — PK TURNOVER CYSTO RM

## (undated) DEVICE — CATH URETRL OPN/END 5F70CM

## (undated) DEVICE — DUAL LUMEN URETERAL CATHETER

## (undated) DEVICE — URETERAL ACCESS SHEATH SET: Brand: NAVIGATOR HD

## (undated) DEVICE — WATER 50W MAX / AIR 8W MAX: Brand: FLEXIVA TRACTIP